# Patient Record
Sex: MALE | Race: WHITE
[De-identification: names, ages, dates, MRNs, and addresses within clinical notes are randomized per-mention and may not be internally consistent; named-entity substitution may affect disease eponyms.]

---

## 2019-06-10 ENCOUNTER — HOSPITAL ENCOUNTER (INPATIENT)
Dept: HOSPITAL 74 - JER | Age: 84
LOS: 7 days | Discharge: HOME | DRG: 641 | End: 2019-06-17
Attending: INTERNAL MEDICINE | Admitting: INTERNAL MEDICINE
Payer: COMMERCIAL

## 2019-06-10 VITALS — BODY MASS INDEX: 18.8 KG/M2

## 2019-06-10 DIAGNOSIS — R06.2: ICD-10-CM

## 2019-06-10 DIAGNOSIS — J98.11: ICD-10-CM

## 2019-06-10 DIAGNOSIS — E03.9: ICD-10-CM

## 2019-06-10 DIAGNOSIS — E78.5: ICD-10-CM

## 2019-06-10 DIAGNOSIS — R13.10: ICD-10-CM

## 2019-06-10 DIAGNOSIS — E87.70: ICD-10-CM

## 2019-06-10 DIAGNOSIS — K76.0: ICD-10-CM

## 2019-06-10 DIAGNOSIS — Z95.1: ICD-10-CM

## 2019-06-10 DIAGNOSIS — Z86.73: ICD-10-CM

## 2019-06-10 DIAGNOSIS — I10: ICD-10-CM

## 2019-06-10 DIAGNOSIS — R11.10: ICD-10-CM

## 2019-06-10 DIAGNOSIS — J90: ICD-10-CM

## 2019-06-10 DIAGNOSIS — Z79.84: ICD-10-CM

## 2019-06-10 DIAGNOSIS — N17.9: ICD-10-CM

## 2019-06-10 DIAGNOSIS — E87.5: ICD-10-CM

## 2019-06-10 DIAGNOSIS — E11.9: ICD-10-CM

## 2019-06-10 DIAGNOSIS — E87.1: Primary | ICD-10-CM

## 2019-06-10 LAB
ALBUMIN SERPL-MCNC: 3.2 G/DL (ref 3.4–5)
ALP SERPL-CCNC: 113 U/L (ref 45–117)
ALT SERPL-CCNC: 22 U/L (ref 13–61)
ANION GAP SERPL CALC-SCNC: 7 MMOL/L (ref 8–16)
ANION GAP SERPL CALC-SCNC: 7 MMOL/L (ref 8–16)
AST SERPL-CCNC: 25 U/L (ref 15–37)
BASOPHILS # BLD: 0.8 % (ref 0–2)
BILIRUB SERPL-MCNC: 0.7 MG/DL (ref 0.2–1)
BUN SERPL-MCNC: 22.4 MG/DL (ref 7–18)
BUN SERPL-MCNC: 25 MG/DL (ref 7–18)
CALCIUM SERPL-MCNC: 8.3 MG/DL (ref 8.5–10.1)
CALCIUM SERPL-MCNC: 9 MG/DL (ref 8.5–10.1)
CHLORIDE SERPL-SCNC: 89 MMOL/L (ref 98–107)
CHLORIDE SERPL-SCNC: 91 MMOL/L (ref 98–107)
CO2 SERPL-SCNC: 26 MMOL/L (ref 21–32)
CO2 SERPL-SCNC: 27 MMOL/L (ref 21–32)
CREAT SERPL-MCNC: 1.6 MG/DL (ref 0.55–1.3)
CREAT SERPL-MCNC: 1.6 MG/DL (ref 0.55–1.3)
DEPRECATED RDW RBC AUTO: 12.8 % (ref 11.9–15.9)
EOSINOPHIL # BLD: 0.1 % (ref 0–4.5)
GLUCOSE SERPL-MCNC: 193 MG/DL (ref 74–106)
GLUCOSE SERPL-MCNC: 240 MG/DL (ref 74–106)
HCT VFR BLD CALC: 38.1 % (ref 35.4–49)
HGB BLD-MCNC: 12.6 GM/DL (ref 11.7–16.9)
LIPASE SERPL-CCNC: 77 U/L (ref 73–393)
LYMPHOCYTES # BLD: 21.3 % (ref 8–40)
MCH RBC QN AUTO: 31.5 PG (ref 25.7–33.7)
MCHC RBC AUTO-ENTMCNC: 33.1 G/DL (ref 32–35.9)
MCV RBC: 95.2 FL (ref 80–96)
MONOCYTES # BLD AUTO: 8.3 % (ref 3.8–10.2)
NEUTROPHILS # BLD: 69.5 % (ref 42.8–82.8)
PLATELET # BLD AUTO: 219 K/MM3 (ref 134–434)
PMV BLD: 8.2 FL (ref 7.5–11.1)
POTASSIUM SERPLBLD-SCNC: 5.4 MMOL/L (ref 3.5–5.1)
POTASSIUM SERPLBLD-SCNC: 5.8 MMOL/L (ref 3.5–5.1)
PROT SERPL-MCNC: 7.2 G/DL (ref 6.4–8.2)
RBC # BLD AUTO: 4.01 M/MM3 (ref 4–5.6)
SODIUM SERPL-SCNC: 123 MMOL/L (ref 136–145)
SODIUM SERPL-SCNC: 124 MMOL/L (ref 136–145)
WBC # BLD AUTO: 4.7 K/MM3 (ref 4–10)

## 2019-06-10 PROCEDURE — G0378 HOSPITAL OBSERVATION PER HR: HCPCS

## 2019-06-10 RX ADMIN — HEPARIN SODIUM SCH UNIT: 5000 INJECTION, SOLUTION INTRAVENOUS; SUBCUTANEOUS at 22:45

## 2019-06-10 RX ADMIN — SODIUM CHLORIDE SCH MLS/HR: 9 INJECTION, SOLUTION INTRAVENOUS at 18:27

## 2019-06-10 NOTE — PDOC
History of Present Illness





- General


Chief Complaint: Nausea/Vomiting


Stated Complaint: VOMITING/ COUGHING/ ABD PAIN


Time Seen by Provider: 06/10/19 13:29


History Source: Patient, Family (Wife present at bedside)


Exam Limitations: No Limitations





- History of Present Illness


Travel History: No


Initial Comments: 





06/10/19 13:52


88 y/o M with PMHx of DM, Arthritis, Stroke, HTN, HLD, Hypothyroidism presents 

with 2 day hx of nausea and NBNB vomiting. Patient present with his wife, who 

is present at bedside and provide part of the history. Patient was in his usual 

state of health, tolerating diet until saturday evening when suddenly he felt 

nauseous while watching television. Since onset, patient has had 10 episodes of 

NBNB vomiting and has been unable to tolerate PO Intake. This is the first time 

he has experienced this many episodes of vomiting. Additionally he complains of 

mild LUQ pain that has accompanied some episodes.





Pcp: Dr. Hampton ([facility])


Cardio: Dr. Samano ([facility])


PMHx: HTN, HLD, DM, Arthritis, Stroke


PSHx: CABG


Allergies: Denies


Social: Denies tobacco, EtOh, Drugs


FHx: Father had DM








Past History





- Travel


Traveled outside of the country in the last 30 days: No


Close contact w/someone who was outside of country & ill: No





- Past Medical History


Allergies/Adverse Reactions: 


 Allergies











Allergy/AdvReac Type Severity Reaction Status Date / Time


 


No Known Allergies Allergy   Verified 06/10/19 11:21











Home Medications: 


Ambulatory Orders





Carvedilol 12.5 mg PO DAILY 06/10/19 


Cilostazol 50 mg PO DAILY 06/10/19 


Glimepiride 2 mg PO DAILY 06/10/19 


Isosorbide Mononitrate [Isosorbide Mononitrate ER] 60 mg PO DAILY 06/10/19 


Levothyroxine [Synthroid -] 50 mcg PO DAILY 06/10/19 








Cardiac Disorders: Yes (cardiac bypass possible stents)


COPD: No


Diabetes: Yes


GI Disorders: Yes


HTN: Yes


Thyroid Disease: Yes


Other medical history: Arthritis, poor circulation





- Surgical History


Cardiac Surgery: Yes





- Suicide/Smoking/Psychosocial Hx


Smoking History: Never smoked


Information on smoking cessation initiated: No


Hx Alcohol Use: No


Drug/Substance Use Hx: No





Abd/GI Specific PMHX





- Complaint Specific PMHX


Colitis: No


Diverticulitis: No


Gall Bladder Disease: No


GERD: No


Hepatitis: No


Irritable Bowel Synd (IBS): No


Pancreatitis: No


GI Ulcer Disease: No





**Review of Systems





- Review of Systems


Able to Perform ROS?: Yes


Is the patient limited English proficient: Yes


Constitutional: No: Chills, Diaphoresis, Fever


HEENTM: No: Blurred Vision, Throat Pain


Respiratory: Yes: Cough, Wheezing.  No: Shortness of Breath


Cardiac (ROS): No: Chest Pain, Edema


ABD/GI: Yes: Nausea, Vomiting.  No: Constipated, Diarrhea


: No: Dysuria, Hematuria


Neurological: No: Numbness, Tingling





*Physical Exam





- Vital Signs


 Last Vital Signs











Temp Pulse Resp BP Pulse Ox


 


 97.5 F L  73   18   104/80   97 


 


 06/10/19 11:17  06/10/19 11:17  06/10/19 11:17  06/10/19 11:17  06/10/19 11:17














- Physical Exam


General Appearance: Yes: Nourished, Appropriately Dressed


HEENT: positive: EOMI, BRENNA.  negative: Pharyngeal Erythema, Tonsillar Exudate


Neck: positive: Supple


Respiratory/Chest: positive: Wheezing, Other (Diminished breath sounds at the 

bases)


Cardiovascular: positive: Regular Rhythm, Regular Rate, S1, S2.  negative: Edema

, JVD, Murmur


Gastrointestinal/Abdominal: positive: Normal Bowel Sounds, Soft.  negative: 

Guarding, Rebound, Tenderness


Musculoskeletal: negative: CVA Tenderness


Extremity: negative: Swelling


Neurologic: positive: CNs II-XII NML intact, Fully Oriented, Alert





ED Treatment Course





- LABORATORY


CBC & Chemistry Diagram: 


 06/10/19 13:58





 06/10/19 13:58





Medical Decision Making





- Medical Decision Making





06/10/19 14:14


88 y/o M with PMHx of DM, Arthritis, Stroke, HTN, HLD, Hypothyroidism presents 

with 2 day hx of nausea and NBNB vomiting.


Check CBC, CMP, Trop, Lipase, CXR


Give 1L NS, Acetaminophen 1g, Zofran, Duoneb


Will reasses








06/10/19 15:51


 Laboratory Last Values











WBC  4.7 K/mm3 (4.0-10.0)   06/10/19  13:58    


 


RBC  4.01 M/mm3 (4.00-5.60)   06/10/19  13:58    


 


Hgb  12.6 GM/dL (11.7-16.9)   06/10/19  13:58    


 


Hct  38.1 % (35.4-49)   06/10/19  13:58    


 


MCV  95.2 fl (80-96)   06/10/19  13:58    


 


MCH  31.5 pg (25.7-33.7)   06/10/19  13:58    


 


MCHC  33.1 g/dl (32.0-35.9)   06/10/19  13:58    


 


RDW  12.8 % (11.9-15.9)   06/10/19  13:58    


 


Plt Count  219 K/MM3 (134-434)   06/10/19  13:58    


 


MPV  8.2 fl (7.5-11.1)   06/10/19  13:58    


 


Absolute Neuts (auto)  3.2 K/mm3 (1.5-8.0)   06/10/19  13:58    


 


Neutrophils %  69.5 % (42.8-82.8)   06/10/19  13:58    


 


Lymphocytes %  21.3 % (8-40)   06/10/19  13:58    


 


Monocytes %  8.3 % (3.8-10.2)   06/10/19  13:58    


 


Eosinophils %  0.1 % (0-4.5)   06/10/19  13:58    


 


Basophils %  0.8 % (0-2.0)   06/10/19  13:58    


 


Nucleated RBC %  0 % (0-0)   06/10/19  13:58    


 


Sodium  123 mmol/L (136-145)  L  06/10/19  13:58    


 


Potassium  5.8 mmol/L (3.5-5.1)  H  06/10/19  13:58    


 


Chloride  89 mmol/L ()  L  06/10/19  13:58    


 


Carbon Dioxide  27 mmol/L (21-32)   06/10/19  13:58    


 


Anion Gap  7 MMOL/L (8-16)  L  06/10/19  13:58    


 


BUN  22.4 mg/dL (7-18)  H  06/10/19  13:58    


 


Creatinine  1.6 mg/dL (0.55-1.3)  H  06/10/19  13:58    


 


Est GFR (CKD-EPI)AfAm  43.62   06/10/19  13:58    


 


Est GFR (CKD-EPI)NonAf  37.64   06/10/19  13:58    


 


Random Glucose  193 mg/dL ()  H  06/10/19  13:58    


 


Calcium  9.0 mg/dL (8.5-10.1)   06/10/19  13:58    


 


Total Bilirubin  0.7 mg/dL (0.2-1)   06/10/19  13:58    


 


AST  25 U/L (15-37)   06/10/19  13:58    


 


ALT  22 U/L (13-61)   06/10/19  13:58    


 


Alkaline Phosphatase  113 U/L ()   06/10/19  13:58    


 


Troponin I  0.03 ng/ml (0.00-0.05)   06/10/19  13:58    


 


Total Protein  7.2 g/dl (6.4-8.2)   06/10/19  13:58    


 


Albumin  3.2 g/dl (3.4-5.0)  L  06/10/19  13:58    


 


Lipase  77 U/L ()   06/10/19  13:58    








Labwork noted above noted Na 123, K+ 5.8, Cl 89.


Dr. ren paged for admission for symptomatic hyponatremia.








06/10/19 16:29


Case discussed with Dr. Ren--will observe on med-surg


Start IV Hydration with NS @ 100 mls/hr; Goal is to raise Na no greater than 8 

in the first 24 hours.





06/10/19 17:13


CXR: A single AP view of the chest reveals a left effusion with left base 

atelectasis or infiltrate. There is a large heart, unfolded aorta, normal johnny, 

clear left upper lobe and clear right lung. There are sternal sutures. 

Correlation recommended. Follow-up needed. 





Given weakness and productive cough, will cover with Azithromycin and 

Ceftriaxone. 








*DC/Admit/Observation/Transfer


Diagnosis at time of Disposition: 


 Hyponatremia, Wheezing








- Discharge Dispostion


Condition at time of disposition: Guarded


Decision to Admit order: Yes





- Referrals





- Patient Instructions





- Post Discharge Activity

## 2019-06-10 NOTE — HP
Admitting History and Physical





- Primary Care Physician


PCP: Ely Villatoro





- Admission


History of Present Illness: 





89 y/o M with PMHx of DM, Arthritis, Stroke, HTN, HLD, Hypothyroidism presents 

with 2 day hx of nausea and NBNB vomiting. Patient present with his wife, who 

is present at bedside and provide part of the history. Patient was in his usual 

state of health, tolerating diet until saturday evening when suddenly he felt 

nauseous while watching television. Since onset, patient has had 10 episodes of 

NBNB vomiting and has been unable to tolerate PO Intake. This is the first time 

he has experienced this many episodes of vomiting. Additionally he complains of 

mild LUQ pain that has accompanied some episodes.











- Past Medical History


Cardiovascular: Yes: HTN, Hyperlipdemia





- Smoking History


Smoking history: Never smoked





- Alcohol/Substance Use


Hx Alcohol Use: No





Home Medications





- Allergies


Allergies/Adverse Reactions: 


 Allergies











Allergy/AdvReac Type Severity Reaction Status Date / Time


 


No Known Allergies Allergy   Verified 06/10/19 11:21














- Home Medications


Home Medications: 


Ambulatory Orders





Carvedilol 12.5 mg PO DAILY 06/10/19 


Cilostazol 50 mg PO DAILY 06/10/19 


Glimepiride 2 mg PO DAILY 06/10/19 


Isosorbide Mononitrate [Isosorbide Mononitrate ER] 60 mg PO DAILY 06/10/19 


Levothyroxine [Synthroid -] 50 mcg PO DAILY 06/10/19 











Physical Examination


Vital Signs: 


 Vital Signs











Temperature  98.5 F   06/10/19 17:52


 


Pulse Rate  72   06/10/19 17:52


 


Respiratory Rate  19   06/10/19 17:52


 


Blood Pressure  133/68   06/10/19 17:52


 


O2 Sat by Pulse Oximetry (%)  99   06/10/19 17:52











Constitutional: Yes: No Distress


HENT: Yes: Atraumatic


Neck: Yes: Supple


Cardiovascular: Yes: Regular Rate and Rhythm


Respiratory: Yes: CTA Bilaterally


Gastrointestinal: Yes: Normal Bowel Sounds


Extremities: Yes: WNL


Edema: No


Peripheral Pulses WNL: Yes


Neurological: Yes: Alert, Oriented


Labs: 


 CBC, BMP





 06/10/19 13:58 





 06/10/19 13:58 











Imaging





- Results


X-ray: Report Reviewed


Cat Scan: Report Reviewed





Problem List





- Problems


(1) Hyponatremia


Assessment/Plan: 


will replace


Code(s): E87.1 - HYPO-OSMOLALITY AND HYPONATREMIA   





(2) Wheezing


Code(s): R06.2 - WHEEZING   





(3) Diabetes mellitus


Assessment/Plan: 


monitor bs


Code(s): E11.9 - TYPE 2 DIABETES MELLITUS WITHOUT COMPLICATIONS   





(4) HTN (hypertension)


Assessment/Plan: 


on meds


Code(s): I10 - ESSENTIAL (PRIMARY) HYPERTENSION   





(5) Hyperkalemia


Assessment/Plan: 


monitor and treat 


renal on board


Code(s): E87.5 - HYPERKALEMIA   





(6) Vomiting


Assessment/Plan: 


prn zofran


better


Code(s): R11.10 - VOMITING, UNSPECIFIED   





Assessment/Plan





 Laboratory Tests











  06/10/19 06/10/19





  13:58 13:58


 


WBC  4.7 


 


RBC  4.01 


 


Hgb  12.6 


 


Hct  38.1 


 


MCV  95.2 


 


MCH  31.5 


 


MCHC  33.1 


 


RDW  12.8 


 


Plt Count  219 


 


MPV  8.2 


 


Absolute Neuts (auto)  3.2 


 


Neutrophils %  69.5 


 


Lymphocytes %  21.3 


 


Monocytes %  8.3 


 


Eosinophils %  0.1 


 


Basophils %  0.8 


 


Nucleated RBC %  0 


 


Sodium   123 L


 


Potassium   5.8 H


 


Chloride   89 L


 


Carbon Dioxide   27


 


Anion Gap   7 L


 


BUN   22.4 H


 


Creatinine   1.6 H


 


Est GFR (CKD-EPI)AfAm   43.62


 


Est GFR (CKD-EPI)NonAf   37.64


 


Random Glucose   193 H


 


Calcium   9.0


 


Total Bilirubin   0.7


 


AST   25


 


ALT   22


 


Alkaline Phosphatase   113


 


Troponin I   0.03


 


Total Protein   7.2


 


Albumin   3.2 L


 


Lipase   77








Active Medications











Generic Name Dose Route Start Last Admin





  Trade Name Freq  PRN Reason Stop Dose Admin


 


Carvedilol  12.5 mg  06/11/19 10:00  





  Coreg -  PO   





  DAILY ERICA   





     





     





     





     


 


Cilostazol  50 mg  06/11/19 10:00  





  Pletal -  PO   





  DAILY ERICA   





     





     





     





     


 


Glimepiride  2 mg  06/11/19 10:00  





  Amaryl -  PO   





  DAILY ERICA   





     





     





     





     


 


Sodium Chloride  1,000 mls @ 100 mls/hr  06/10/19 17:00  06/10/19 17:51





  Normal Saline -  IV   100 mls/hr





  ASDIR ERICA   Administration





     





     





     





     


 


Azithromycin 500 mg/ Dextrose  250 mls @ 250 mls/hr  06/10/19 17:15  06/10/19 17

:55





  IVPB   250 mls/hr





  DAILY ERICA   Administration





     





     





     





     


 


Ceftriaxone Sodium 1 gm/  100 mls @ 200 mls/hr  06/10/19 17:15  06/10/19 17:51





  Dextrose  IVPB   200 mls/hr





  DAILY ERICA   Administration





     





     





  Protocol   





     


 


Sodium Chloride  1,000 mls @ 100 mls/hr  06/10/19 18:15  





  Normal Saline -  IV   





  ASDIR ERICA   





     





     





     





     


 


Isosorbide Mononitrate  60 mg  06/11/19 10:00  





  Imdur -  PO   





  DAILY ERICA   





     





     





     





     


 


Levothyroxine Sodium  50 mcg  06/11/19 10:00  





  Synthroid -  PO   





  DAILY ERICA

## 2019-06-10 NOTE — CONSULT
Consult


Consult Specialty:: Nephrology


Reason for Consultation:: hyponatremia and hyperkalemia





- History of Present Illness


Chief Complaint: nausea and vomiting


History of Present Illness: 





Pt is an 89 year old male with pmhx of DM, arhtritis, CVA, HLD, hypothyroisims, 

and HTN who presents to the ER with nausea and vomiting. He says that he has 

had these symptoms for the last 5 days. He says that the nausea and vomiting 

have gotten worse over the last two days. HE denies abdominal pain. He denies 

nsaid use. He was found to be hyponatremic and hyperkalemic. He has loss of 

appetite. He denies dysuria or hematuria. No one else at home has similar 

symptoms. He does not recall eating anything out of the ordinary. 





- History Source


History Provided By: Patient, Medical Record





- Past Medical History


Cardio/Vascular: Yes: HTN, Hyperlipdemia


Endocrine: Yes: Diabetes Mellitus, Hypothyroidism





- Alcohol/Substance Use


Hx Alcohol Use: No





- Smoking History


Smoking history: Never smoked





Home Medications





- Allergies


Allergies/Adverse Reactions: 


 Allergies











Allergy/AdvReac Type Severity Reaction Status Date / Time


 


No Known Allergies Allergy   Verified 06/10/19 11:21














- Home Medications


Home Medications: 


Ambulatory Orders





Carvedilol 12.5 mg PO DAILY 06/10/19 


Cilostazol 50 mg PO DAILY 06/10/19 


Glimepiride 2 mg PO DAILY 06/10/19 


Isosorbide Mononitrate [Isosorbide Mononitrate ER] 60 mg PO DAILY 06/10/19 


Levothyroxine [Synthroid -] 50 mcg PO DAILY 06/10/19 











Family Disease History





- Family Disease History


Family History: Denies





Review of Systems





- Review of Systems


Constitutional: reports: Malaise.  denies: Chills, Fever


Eyes: reports: No Symptoms


HENT: reports: No Symptoms


Neck: reports: No Symptoms


Cardiovascular: reports: No Symptoms


Respiratory: reports: No Symptoms


Gastrointestinal: reports: Nausea, Vomiting.  denies: Vomiting Blood


Genitourinary: reports: No Symptoms


Musculoskeletal: reports: No Symptoms


Integumentary: reports: No Symptoms


Neurological: reports: No Symptoms


Endocrine: reports: No Symptoms


Hematology/Lymphatic: reports: No Symptoms


Psychiatric: reports: No Symptoms





Physical Exam


Vital Signs: 


 Vital Signs











Temperature  98.5 F   06/10/19 17:52


 


Pulse Rate  72   06/10/19 17:52


 


Respiratory Rate  19   06/10/19 17:52


 


Blood Pressure  133/68   06/10/19 17:52


 


O2 Sat by Pulse Oximetry (%)  99   06/10/19 17:52











Constitutional: Yes: Calm


Eyes: Yes: Conjunctiva Clear


HENT: Yes: Atraumatic


Neck: Yes: Supple


Cardiovascular: Yes: S1, S2


Respiratory: Yes: CTA Bilaterally


Gastrointestinal: Yes: Normal Bowel Sounds, Soft


Renal/: Yes: WNL


Musculoskeletal: Yes: WNL


Edema: No


Neurological: Yes: Oriented


Psychiatric: Yes: Oriented


Labs: 


 CBC, BMP





 06/10/19 13:58 





 06/10/19 13:58 





 Laboratory Tests











  06/10/19 06/10/19





  13:58 13:58


 


WBC  4.7 


 


Hgb  12.6 


 


Plt Count  219 


 


Sodium   123 L


 


Potassium   5.8 H


 


Chloride   89 L


 


BUN   22.4 H


 


Creatinine   1.6 H


 


Random Glucose   193 H


 


Albumin   3.2 L














Imaging





- Results


Chest X-ray: Report Reviewed (left pleural effusion)





Problem List





- Problems


(1) Hyperkalemia


Code(s): E87.5 - HYPERKALEMIA   





(2) HTN (hypertension)


Code(s): I10 - ESSENTIAL (PRIMARY) HYPERTENSION   





(3) Diabetes mellitus


Code(s): E11.9 - TYPE 2 DIABETES MELLITUS WITHOUT COMPLICATIONS   





(4) Vomiting


Code(s): R11.10 - VOMITING, UNSPECIFIED   





(5) Hyponatremia


Code(s): E87.1 - HYPO-OSMOLALITY AND HYPONATREMIA   





Assessment/Plan





 Current Medications











Generic Name Dose Route Start Last Admin





  Trade Name Freq  PRN Reason Stop Dose Admin


 


Carvedilol  12.5 mg  06/11/19 10:00  





  Coreg -  PO   





  DAILY ERICA   





     





     





     





     


 


Cilostazol  50 mg  06/11/19 10:00  





  Pletal -  PO   





  DAILY ERICA   





     





     





     





     


 


Glimepiride  2 mg  06/11/19 07:00  





  Amaryl -  PO   





  ACBK ERICA   





     





     





     





     


 


Heparin Sodium (Porcine)  5,000 unit  06/10/19 22:00  





  Heparin -  SQ   





  BID ERICA   





     





     





     





     


 


Azithromycin 500 mg/ Dextrose  250 mls @ 250 mls/hr  06/10/19 17:15  06/10/19 17

:55





  IVPB   250 mls/hr





  DAILY ERICA   Administration





     





     





     





     


 


Ceftriaxone Sodium 1 gm/  100 mls @ 200 mls/hr  06/10/19 17:15  06/10/19 17:51





  Dextrose  IVPB   200 mls/hr





  DAILY ERICA   Administration





     





     





  Protocol   





     


 


Sodium Chloride  1,000 mls @ 100 mls/hr  06/10/19 18:15  06/10/19 18:27





  Normal Saline -  IV   100 mls/hr





  ASDIR ERICA   Administration





     





     





     





     


 


Isosorbide Mononitrate  60 mg  06/11/19 10:00  





  Imdur -  PO   





  DAILY ERICA   





     





     





     





     


 


Levothyroxine Sodium  50 mcg  06/11/19 10:00  





  Synthroid -  PO   





  DAILY@0700 ERICA   





     





     





     





     








Impression


1. hyponatremia


2. hyperkalemia


3. nausea and vomiting


4. htn


5. dm


6. hypothyroidism


7. shalom vs ckd





Plan


- check ua


- check urine lytes and crt


- repeat bmp, spoke to nurse to call me with results


- can cont fluids for now


- check renal ultrasound


- electrolyte abnormalities may be from vomiting

## 2019-06-11 LAB
ALBUMIN SERPL-MCNC: 3 G/DL (ref 3.4–5)
ALP SERPL-CCNC: 107 U/L (ref 45–117)
ALT SERPL-CCNC: 19 U/L (ref 13–61)
ANION GAP SERPL CALC-SCNC: 8 MMOL/L (ref 8–16)
APPEARANCE UR: CLEAR
AST SERPL-CCNC: 18 U/L (ref 15–37)
BACTERIA #/AREA URNS HPF: 0.3 /HPF
BASOPHILS # BLD: 0.9 % (ref 0–2)
BILIRUB SERPL-MCNC: 0.5 MG/DL (ref 0.2–1)
BILIRUB UR STRIP.AUTO-MCNC: NEGATIVE MG/DL
BUN SERPL-MCNC: 22.1 MG/DL (ref 7–18)
CALCIUM SERPL-MCNC: 8.6 MG/DL (ref 8.5–10.1)
CASTS #/AREA URNS LPF: 3 /LPF (ref 0–8)
CHLORIDE SERPL-SCNC: 94 MMOL/L (ref 98–107)
CO2 SERPL-SCNC: 24 MMOL/L (ref 21–32)
COLOR UR: YELLOW
CREAT SERPL-MCNC: 1.6 MG/DL (ref 0.55–1.3)
DEPRECATED RDW RBC AUTO: 13.1 % (ref 11.9–15.9)
EOSINOPHIL # BLD: 0.7 % (ref 0–4.5)
EPITH CASTS URNS QL MICRO: 0.2 /HPF
GLUCOSE SERPL-MCNC: 103 MG/DL (ref 74–106)
HCT VFR BLD CALC: 35.6 % (ref 35.4–49)
HGB BLD-MCNC: 12 GM/DL (ref 11.7–16.9)
KETONES UR QL STRIP: NEGATIVE
LEUKOCYTE ESTERASE UR QL STRIP.AUTO: NEGATIVE
LYMPHOCYTES # BLD: 30.5 % (ref 8–40)
MCH RBC QN AUTO: 32 PG (ref 25.7–33.7)
MCHC RBC AUTO-ENTMCNC: 33.7 G/DL (ref 32–35.9)
MCV RBC: 95.1 FL (ref 80–96)
MONOCYTES # BLD AUTO: 13.5 % (ref 3.8–10.2)
NEUTROPHILS # BLD: 54.4 % (ref 42.8–82.8)
NITRITE UR QL STRIP: NEGATIVE
PH UR: 5.5 [PH] (ref 5–8)
PLATELET # BLD AUTO: 194 K/MM3 (ref 134–434)
PMV BLD: 8.6 FL (ref 7.5–11.1)
POTASSIUM SERPLBLD-SCNC: 5.5 MMOL/L (ref 3.5–5.1)
PROT SERPL-MCNC: 6.5 G/DL (ref 6.4–8.2)
PROT UR QL STRIP: (no result)
PROT UR QL STRIP: NEGATIVE
RBC # BLD AUTO: 1 /HPF (ref 0–4)
RBC # BLD AUTO: 3.74 M/MM3 (ref 4–5.6)
SODIUM SERPL-SCNC: 127 MMOL/L (ref 136–145)
SP GR UR: 1.02 (ref 1.01–1.03)
UROBILINOGEN UR STRIP-MCNC: 0.2 MG/DL (ref 0.2–1)
WBC # BLD AUTO: 5 K/MM3 (ref 4–10)
WBC # UR AUTO: 0 /HPF (ref 0–5)

## 2019-06-11 RX ADMIN — SODIUM CHLORIDE SCH MLS/HR: 9 INJECTION, SOLUTION INTRAVENOUS at 03:03

## 2019-06-11 RX ADMIN — LEVOTHYROXINE SODIUM SCH MCG: 50 TABLET ORAL at 09:01

## 2019-06-11 RX ADMIN — AZITHROMYCIN DIHYDRATE SCH MLS/HR: 500 INJECTION, POWDER, LYOPHILIZED, FOR SOLUTION INTRAVENOUS at 10:24

## 2019-06-11 RX ADMIN — SODIUM CHLORIDE SCH: 9 INJECTION, SOLUTION INTRAVENOUS at 21:25

## 2019-06-11 RX ADMIN — CARVEDILOL SCH MG: 12.5 TABLET, FILM COATED ORAL at 09:01

## 2019-06-11 RX ADMIN — HEPARIN SODIUM SCH UNIT: 5000 INJECTION, SOLUTION INTRAVENOUS; SUBCUTANEOUS at 21:25

## 2019-06-11 RX ADMIN — CILOSTAZOL SCH MG: 50 TABLET ORAL at 10:23

## 2019-06-11 RX ADMIN — GLIMEPIRIDE SCH MG: 2 TABLET ORAL at 07:08

## 2019-06-11 RX ADMIN — ISOSORBIDE MONONITRATE SCH MG: 60 TABLET, EXTENDED RELEASE ORAL at 09:01

## 2019-06-11 RX ADMIN — CEFTRIAXONE SCH MLS/HR: 1 INJECTION, POWDER, FOR SOLUTION INTRAMUSCULAR; INTRAVENOUS at 09:52

## 2019-06-11 RX ADMIN — PANTOPRAZOLE SODIUM SCH MG: 40 TABLET, DELAYED RELEASE ORAL at 15:36

## 2019-06-11 RX ADMIN — HEPARIN SODIUM SCH UNIT: 5000 INJECTION, SOLUTION INTRAVENOUS; SUBCUTANEOUS at 09:01

## 2019-06-11 RX ADMIN — SODIUM CHLORIDE SCH MLS/HR: 9 INJECTION, SOLUTION INTRAVENOUS at 16:37

## 2019-06-11 NOTE — CONS
DATE OF CONSULTATION:  

 

DATE OF DICTATION:  06/11/2019 

 

GASTROENTEROLOGY CONSULTATION 

 

The patient is an 89-year-old male with a past medical history of diabetes,

arthritis, CVA, hypertension, hyperlipidemia, hypothyroidism, who presents with 
a

2-day history of nausea and vomiting.  As per the patient's history, he states 
that

he is still spitting up phlegm, and he is experiencing some epigastric burning

sensation.  His symptoms apparently began on Saturday, after he developed nausea

while watching television.  He denies previous episodes in the past.  At this 
time.

He denies any abdominal pain, diarrhea, constipation, blood in the stool or

hematemesis.  He has never had an endoscopic evaluation, as per his history and

review of the chart.  

 

PAST MEDICAL AND SURGICAL HISTORY:  As listed in the HPI.

 

ALLERGIES:  No known drug allergies. 

 

SOCIAL HISTORY:  Does not drink or smoke.  Lives with family.

 

HOME MEDICATIONS:  Include carvedilol, cilostazol, glimepiride, isosorbide and

Synthroid. 

 

FAMILY HISTORY:  No history of GI or gynecological malignancy. 

 

PHYSICAL EXAMINATION:  

Vital Signs:  Temperature 98, pulse 80, respiratory rate 12, blood pressure 150/
93,

pulse oximetry 98% on room air. 

General:  In no acute distress.

HEENT:  Anicteric sclerae. 

Cardiovascular:  S1/S2.  Regular rate and rhythm.  

Lungs:  Bilaterally clear to auscultation.  

Abdomen:  Soft and nontender. 

Extremities:  No edema.

 

LABORATORY DATA:  White blood cell count 5, hemoglobin 12, hematocrit 35, MCV 95
,

platelet count 194.  Sodium 127, potassium 5.5, BUN 22, creatinine 1.6, glucose 
103,

AST 18, ALT 19, alkaline phosphatase 107, total bilirubin 0.5.  Urine protein 1+
. 

 

IMAGING:  He had an abdomen and pelvis CT scan without contrast, which revealed

pleural effusion, bibasilar consolidation versus atelectasis, mild cardiomegaly
, and

calcification of the coronary arteries.  Limited evaluation of the abdomen and 
pelvis

considering there is no contrast.  There is no evidence of a small bowel 
obstruction.

 However, dense calcified plaques in the abdominal aorta wall down to its

bifurcation, without aneurysmal dilation.  Subcutaneous edema.  A suggestion of

anasarca.  

 

IMPRESSION:  Nausea and vomiting with epigastric burning sensation, further

complicated by hyponatremia and wheezing.  His current symptoms may be 
secondary to

gastroesophageal reflux disease, peptic ulcer disease or an infectious 
etiology. 

 

RECOMMENDATION:  He should be started on Protonix 40 mg IV daily.  Clear liquid 
diet

today and can be advanced as tolerated to a full liquid diet and to a bland 
diet. 

Further evaluation of his hyponatremia as per the primary medical team.  
Continue

antibiotics and nebulizers for his wheeze and pulmonary process.  He is a very 
poor

historian.  There is some expression of  dysphagia.  Will therefore order an

esophagram and upper GI series to further evaluate.  Would prefer to hold off 
on any

invasive procedures at this time considering his cardiopulmonary process and 
advanced

age.  The patient will be followed by the GI service. 

 

 

DO CHRISTIANO SANTA/[id number]

DD: 06/11/2019 13:35

DT: 06/11/2019 19:09

Job #:  [id number]

MTDD

## 2019-06-11 NOTE — CON.ID
Consult


Consult Specialty:: infectious diseases


Referred by:: 


Reason for Consultation:: cough sputum production





- History of Present Illness


Chief Complaint: cough with sputum production


History of Present Illness: 





88 y/o M with PMHx of DM, Arthritis, Stroke, HTN, HLD, Hypothyroidism presents 

with 2 day hx of nausea and NBNB vomiting. Patient was in his usual state of 

health, tolerating diet until saturday evening when suddenly he felt nauseous 

while watching television. Since onset, patient has had 10 episodes of NBNB 

vomiting and has been unable to tolerate PO Intake. This is the first time he 

has experienced this many episodes of vomiting. Additionally he complains of 

mild LUQ pain that has accompanied some episodes.


patient now mentions that he has no abd issues but is producing cough and 

thinks his lungs are involved











- History Source


History Provided By: Patient, Caregiver


Limitations to Obtaining History: Language Barrier





- Past Medical History


Cardio/Vascular: Yes: HTN, Hyperlipdemia


Endocrine: Yes: Diabetes Mellitus, Hypothyroidism





- Alcohol/Substance Use


Hx Alcohol Use: No





- Smoking History


Smoking history: Never smoked





Home Medications





- Allergies


Allergies/Adverse Reactions: 


 Allergies











Allergy/AdvReac Type Severity Reaction Status Date / Time


 


No Known Allergies Allergy   Verified 06/10/19 11:21














- Home Medications


Home Medications: 


Ambulatory Orders





Carvedilol 12.5 mg PO DAILY 06/10/19 


Cilostazol 50 mg PO DAILY 06/10/19 


Glimepiride 2 mg PO DAILY 06/10/19 


Isosorbide Mononitrate [Isosorbide Mononitrate ER] 60 mg PO DAILY 06/10/19 


Levothyroxine [Synthroid -] 50 mcg PO DAILY 06/10/19 











Review of Systems





- Review of Systems


Constitutional: reports: No Symptoms


Eyes: reports: No Symptoms


HENT: reports: No Symptoms


Neck: reports: No Symptoms


Cardiovascular: reports: No Symptoms


Respiratory: reports: Cough, Other (sputum production)


Gastrointestinal: reports: Nausea, Vomiting


Genitourinary: reports: No Symptoms


Breasts: reports: No Symptoms Reported


Musculoskeletal: reports: No Symptoms


Integumentary: reports: No Symptoms


Neurological: reports: No Symptoms


Endocrine: reports: No Symptoms


Hematology/Lymphatic: reports: No Symptoms


Psychiatric: reports: No Symptoms





Physical Exam


Vital Signs: 


 Vital Signs











Temperature  98.8 F   06/11/19 09:57


 


Pulse Rate  80   06/11/19 09:57


 


Respiratory Rate  20   06/11/19 09:57


 


Blood Pressure  153/93   06/11/19 09:57


 


O2 Sat by Pulse Oximetry (%)  98   06/11/19 08:56











Constitutional: Yes: No Distress, Calm, Thin


Neck: Yes: Supple, Trachea Midline


Cardiovascular: Yes: Regular Rate and Rhythm


Respiratory: Yes: Regular, CTA Bilaterally


Gastrointestinal: Yes: Normal Bowel Sounds, Soft


Musculoskeletal: Yes: WNL


Extremities: Yes: WNL


Neurological: Yes: Alert, Oriented


Psychiatric: Yes: Alert, Oriented


Labs: 


 CBC, BMP





 06/11/19 07:20 





 06/11/19 07:20 











Imaging





- Results


Chest X-ray: Report Reviewed, Image Reviewed


Cat Scan: Report Reviewed, Image Reviewed





Assessment/Plan





 Problem List 





- Problems


(1) Hyponatremia


Code(s): E87.1 - HYPO-OSMOLALITY AND HYPONATREMIA   





(2) Wheezing


Code(s): R06.2 - WHEEZING   





3 vomiting


cough





plan





continue current mgmt


await for all reports


rest as per the team

## 2019-06-11 NOTE — PN
Progress Note, Physician


History of Present Illness: 





Pt seen and examined at bedside. He is awake and alert. He denies shortness of 

breath. 





- Current Medication List


Current Medications: 


Active Medications





Carvedilol (Coreg -)  12.5 mg PO DAILY [facility]


   Last Admin: 06/11/19 09:01 Dose:  12.5 mg


Cilostazol (Pletal -)  50 mg PO DAILY [facility]


   Last Admin: 06/11/19 10:23 Dose:  50 mg


Glimepiride (Amaryl -)  2 mg PO ACBK [facility]


   Last Admin: 06/11/19 07:08 Dose:  2 mg


Heparin Sodium (Porcine) (Heparin -)  5,000 unit SQ BID [facility]


   Last Admin: 06/11/19 09:01 Dose:  5,000 unit


Sodium Chloride (Normal Saline -)  1,000 mls @ 100 mls/hr IV ASDIR [facility]


   Last Admin: 06/11/19 03:03 Dose:  100 mls/hr


Ceftriaxone Sodium 1 gm/ (Dextrose)  50 mls @ 100 mls/hr IVPB DAILY [facility]; 

Protocol


   Last Admin: 06/11/19 09:52 Dose:  100 mls/hr


Azithromycin (Zithromax 500mg Ivpb (Pre-Docked))  500 mg in 250 mls @ 250 mls/

hr IVPB DAILY [facility]


   Last Admin: 06/11/19 10:24 Dose:  250 mls/hr


Isosorbide Mononitrate (Imdur -)  60 mg PO DAILY [facility]


   Last Admin: 06/11/19 09:01 Dose:  60 mg


Levothyroxine Sodium (Synthroid -)  50 mcg PO DAILY@0700 [facility]


   Last Admin: 06/11/19 09:01 Dose:  50 mcg


Pantoprazole Sodium (Protonix -)  40 mg PO DAILY [facility]











- Objective


Vital Signs: 


 Vital Signs











Temperature  98.8 F   06/11/19 09:57


 


Pulse Rate  80   06/11/19 09:57


 


Respiratory Rate  20   06/11/19 09:57


 


Blood Pressure  153/93   06/11/19 09:57


 


O2 Sat by Pulse Oximetry (%)  98   06/11/19 08:56











Constitutional: Yes: Calm


Eyes: Yes: Conjunctiva Clear


HENT: Yes: Atraumatic


Neck: Yes: Supple


Cardiovascular: Yes: S1, S2


Respiratory: Yes: CTA Bilaterally


Gastrointestinal: Yes: Soft


Genitourinary: Yes: WNL


Extremities: Yes: WNL


Edema: No


Neurological: Yes: Oriented


Psychiatric: Yes: Oriented


Labs: 


 CBC, BMP





 06/11/19 07:20 





 06/11/19 07:20 











Problem List





- Problems


(1) Hyperkalemia


Code(s): E87.5 - HYPERKALEMIA   





(2) HTN (hypertension)


Code(s): I10 - ESSENTIAL (PRIMARY) HYPERTENSION   





(3) Diabetes mellitus


Code(s): E11.9 - TYPE 2 DIABETES MELLITUS WITHOUT COMPLICATIONS   





(4) Vomiting


Code(s): R11.10 - VOMITING, UNSPECIFIED   





(5) Hyponatremia


Code(s): E87.1 - HYPO-OSMOLALITY AND HYPONATREMIA   





Assessment/Plan


 Current Medications











Generic Name Dose Route Start Last Admin





  Trade Name Freq  PRN Reason Stop Dose Admin


 


Carvedilol  12.5 mg  06/11/19 10:00  06/11/19 09:01





  Coreg -  PO   12.5 mg





  DAILY ERICA   Administration





     





     





     





     


 


Cilostazol  50 mg  06/11/19 10:00  06/11/19 10:23





  Pletal -  PO   50 mg





  DAILY ERICA   Administration





     





     





     





     


 


Glimepiride  2 mg  06/11/19 07:00  06/11/19 07:08





  Amaryl -  PO   2 mg





  ACBK ERICA   Administration





     





     





     





     


 


Heparin Sodium (Porcine)  5,000 unit  06/10/19 22:00  06/11/19 09:01





  Heparin -  SQ   5,000 unit





  BID ERICA   Administration





     





     





     





     


 


Sodium Chloride  1,000 mls @ 100 mls/hr  06/10/19 18:15  06/11/19 03:03





  Normal Saline -  IV   100 mls/hr





  ASDIR ERICA   Administration





     





     





     





     


 


Ceftriaxone Sodium 1 gm/  50 mls @ 100 mls/hr  06/11/19 08:27  06/11/19 09:52





  Dextrose  IVPB   100 mls/hr





  DAILY ERICA   Administration





     





     





  Protocol   





     


 


Azithromycin  500 mg in 250 mls @ 250 mls/hr  06/11/19 09:20  06/11/19 10:24





  Zithromax 500mg Ivpb (Pre-Docked)  IVPB   250 mls/hr





  DAILY ERICA   Administration





     





     





     





     


 


Isosorbide Mononitrate  60 mg  06/11/19 10:00  06/11/19 09:01





  Imdur -  PO   60 mg





  DAILY ERICA   Administration





     





     





     





     


 


Levothyroxine Sodium  50 mcg  06/11/19 10:00  06/11/19 09:01





  Synthroid -  PO   50 mcg





  DAILY@0700 ERICA   Administration





     





     





     





     


 


Pantoprazole Sodium  40 mg  06/11/19 13:45  





  Protonix -  PO   





  DAILY ERICA   





     





     





     





     











Impression


1. hyponatremia


2. hyperkalemia


3. nausea and vomiting


4. htn


5. dm


6. hypothyroidism


7. shalom vs ckd





Plan


- cont with fluids


- sodium improving


- will give a dose of lokelma


- check renal ultrasound, was just done


- electrolyte abnormalities may be from vomiting

## 2019-06-11 NOTE — PN
Progress Note, Physician


History of Present Illness: 





stable





- Current Medication List


Current Medications: 


Active Medications





Carvedilol (Coreg -)  12.5 mg PO DAILY [facility]


   Last Admin: 06/11/19 09:01 Dose:  12.5 mg


Cilostazol (Pletal -)  50 mg PO DAILY [facility]


   Last Admin: 06/11/19 10:23 Dose:  50 mg


Glimepiride (Amaryl -)  2 mg PO ACBK [facility]


   Last Admin: 06/11/19 07:08 Dose:  2 mg


Heparin Sodium (Porcine) (Heparin -)  5,000 unit SQ BID [facility]


   Last Admin: 06/11/19 09:01 Dose:  5,000 unit


Sodium Chloride (Normal Saline -)  1,000 mls @ 100 mls/hr IV ASDIR [facility]


   Last Admin: 06/11/19 03:03 Dose:  100 mls/hr


Ceftriaxone Sodium 1 gm/ (Dextrose)  50 mls @ 100 mls/hr IVPB DAILY [facility]; 

Protocol


   Last Admin: 06/11/19 09:52 Dose:  100 mls/hr


Azithromycin (Zithromax 500mg Ivpb (Pre-Docked))  500 mg in 250 mls @ 250 mls/

hr IVPB DAILY [facility]


   Last Admin: 06/11/19 10:24 Dose:  250 mls/hr


Isosorbide Mononitrate (Imdur -)  60 mg PO DAILY [facility]


   Last Admin: 06/11/19 09:01 Dose:  60 mg


Levothyroxine Sodium (Synthroid -)  50 mcg PO DAILY@0700 [facility]


   Last Admin: 06/11/19 09:01 Dose:  50 mcg


Pantoprazole Sodium (Protonix -)  40 mg PO DAILY [facility]


   Last Admin: 06/11/19 15:36 Dose:  40 mg











- Objective


Vital Signs: 


 Vital Signs











Temperature  98.8 F   06/11/19 09:57


 


Pulse Rate  80   06/11/19 09:57


 


Respiratory Rate  20   06/11/19 09:57


 


Blood Pressure  153/93   06/11/19 09:57


 


O2 Sat by Pulse Oximetry (%)  98   06/11/19 08:56











Constitutional: Yes: No Distress


HENT: Yes: Atraumatic


Neck: Yes: Supple


Cardiovascular: Yes: Regular Rate and Rhythm


Respiratory: Yes: CTA Bilaterally


Gastrointestinal: Yes: Normal Bowel Sounds


Extremities: Yes: WNL


Edema: No


Neurological: Yes: Alert, Oriented


Labs: 


 CBC, BMP





 06/11/19 07:20 





 06/11/19 07:20 











Problem List





- Problems


(1) Hyponatremia


Assessment/Plan: 


will replace


Code(s): E87.1 - HYPO-OSMOLALITY AND HYPONATREMIA   





(2) Wheezing


Code(s): R06.2 - WHEEZING   





(3) Hypothyroid


Code(s): E03.9 - HYPOTHYROIDISM, UNSPECIFIED   





(4) Diabetes mellitus


Assessment/Plan: 


monitor bs


Code(s): E11.9 - TYPE 2 DIABETES MELLITUS WITHOUT COMPLICATIONS   





(5) HTN (hypertension)


Assessment/Plan: 


on meds


Code(s): I10 - ESSENTIAL (PRIMARY) HYPERTENSION   





(6) Hyperkalemia


Assessment/Plan: 


monitor and treat 


renal on board


Code(s): E87.5 - HYPERKALEMIA   





(7) Vomiting


Assessment/Plan: 


prn zofran


resolved


Code(s): R11.10 - VOMITING, UNSPECIFIED

## 2019-06-11 NOTE — EKG
Test Reason : 

Blood Pressure : ***/*** mmHG

Vent. Rate : 064 BPM     Atrial Rate : 064 BPM

   P-R Int : 240 ms          QRS Dur : 146 ms

    QT Int : 534 ms       P-R-T Axes : 000 -04 210 degrees

   QTc Int : 550 ms

 

SINUS RHYTHM WITH 1ST DEGREE A-V BLOCK

LEFT BUNDLE BRANCH BLOCK

ABNORMAL ECG

WHEN COMPARED WITH ECG OF 25-JUN-2009 02:08,

RI INTERVAL HAS INCREASED

 

Confirmed by Alessio Nathan MD ([id number]) on 6/11/2019 12:44:38 PM

 

Referred By:             Confirmed By:Alessio Nathan MD

## 2019-06-11 NOTE — CON.GI
Consult





- History of Present Illness


History of Present Illness: 





GI CONSULT DICTATED 





- PPI 


-CLEAR LIQUID DIET ADVANCE AS TOLERATED 


- ESOPHAGRAM / GI SERIES ORDERED





SEE FULL CONSULT DICTATED  





- Past Medical History


Cardio/Vascular: Yes: HTN, Hyperlipdemia


Endocrine: Yes: Diabetes Mellitus, Hypothyroidism





- Alcohol/Substance Use


Hx Alcohol Use: No





- Smoking History


Smoking history: Never smoked





Home Medications





- Allergies


Allergies/Adverse Reactions: 


 Allergies











Allergy/AdvReac Type Severity Reaction Status Date / Time


 


No Known Allergies Allergy   Verified 06/10/19 11:21














- Home Medications


Home Medications: 


Ambulatory Orders





Carvedilol 12.5 mg PO DAILY 06/10/19 


Cilostazol 50 mg PO DAILY 06/10/19 


Glimepiride 2 mg PO DAILY 06/10/19 


Isosorbide Mononitrate [Isosorbide Mononitrate ER] 60 mg PO DAILY 06/10/19 


Levothyroxine [Synthroid -] 50 mcg PO DAILY 06/10/19 











Physical Exam-GI


Vital Signs: 


 Vital Signs











Temperature  97.8 F   06/11/19 06:52


 


Pulse Rate  68   06/11/19 06:52


 


Respiratory Rate  20   06/11/19 06:52


 


Blood Pressure  146/74   06/11/19 06:52


 


O2 Sat by Pulse Oximetry (%)  98   06/11/19 00:20

## 2019-06-12 LAB
ALBUMIN SERPL-MCNC: 2.9 G/DL (ref 3.4–5)
ALP SERPL-CCNC: 93 U/L (ref 45–117)
ALT SERPL-CCNC: 18 U/L (ref 13–61)
ANION GAP SERPL CALC-SCNC: 8 MMOL/L (ref 8–16)
AST SERPL-CCNC: 20 U/L (ref 15–37)
BILIRUB SERPL-MCNC: 0.3 MG/DL (ref 0.2–1)
BUN SERPL-MCNC: 20.3 MG/DL (ref 7–18)
CALCIUM SERPL-MCNC: 7.8 MG/DL (ref 8.5–10.1)
CHLORIDE SERPL-SCNC: 96 MMOL/L (ref 98–107)
CO2 SERPL-SCNC: 25 MMOL/L (ref 21–32)
CREAT SERPL-MCNC: 1.5 MG/DL (ref 0.55–1.3)
GLUCOSE SERPL-MCNC: 99 MG/DL (ref 74–106)
POTASSIUM SERPLBLD-SCNC: 3.9 MMOL/L (ref 3.5–5.1)
PROT SERPL-MCNC: 5.9 G/DL (ref 6.4–8.2)
SODIUM SERPL-SCNC: 129 MMOL/L (ref 136–145)

## 2019-06-12 RX ADMIN — CARVEDILOL SCH MG: 12.5 TABLET, FILM COATED ORAL at 10:19

## 2019-06-12 RX ADMIN — LEVOTHYROXINE SODIUM SCH MCG: 50 TABLET ORAL at 10:19

## 2019-06-12 RX ADMIN — HEPARIN SODIUM SCH UNIT: 5000 INJECTION, SOLUTION INTRAVENOUS; SUBCUTANEOUS at 10:19

## 2019-06-12 RX ADMIN — AZITHROMYCIN DIHYDRATE SCH MLS/HR: 500 INJECTION, POWDER, LYOPHILIZED, FOR SOLUTION INTRAVENOUS at 10:20

## 2019-06-12 RX ADMIN — PANTOPRAZOLE SODIUM SCH MG: 40 TABLET, DELAYED RELEASE ORAL at 10:19

## 2019-06-12 RX ADMIN — GLIMEPIRIDE SCH MG: 2 TABLET ORAL at 10:19

## 2019-06-12 RX ADMIN — CILOSTAZOL SCH MG: 50 TABLET ORAL at 10:22

## 2019-06-12 RX ADMIN — HEPARIN SODIUM SCH UNIT: 5000 INJECTION, SOLUTION INTRAVENOUS; SUBCUTANEOUS at 21:59

## 2019-06-12 RX ADMIN — ISOSORBIDE MONONITRATE SCH MG: 60 TABLET, EXTENDED RELEASE ORAL at 10:19

## 2019-06-12 RX ADMIN — CEFTRIAXONE SCH MLS/HR: 1 INJECTION, POWDER, FOR SOLUTION INTRAMUSCULAR; INTRAVENOUS at 12:32

## 2019-06-12 NOTE — PN.GI
GI Progress Note


Subjective: 





no new complaints - eating with family at the bedside. 





- Objective


Vital Signs: 


 Vital Signs











Temperature  97.7 F   06/12/19 16:20


 


Pulse Rate  77   06/12/19 16:20


 


Respiratory Rate  18   06/12/19 16:20


 


Blood Pressure  117/69   06/12/19 16:20


 


O2 Sat by Pulse Oximetry (%)  97   06/12/19 09:00











Constitutional: Well Nourished, No Distress, Calm


Eyes: Yes: WNL


HENT: Yes: WNL


Neck: Yes: WNL


Cardiovascular: Yes: WNL


Respiratory: Yes: WNL


Gastrointestinal Inspection: Yes: WNL


...Auscultate: Yes: Normoactive Bowel Sounds


Extremities: Yes: WNL


Edema: No


Labs: 


 CBC, BMP





 06/11/19 07:20 





 06/12/19 06:21 











Problem List





- Problems


(1) Dysphagia


Assessment/Plan: 


upper gi series and esophagram reviewed and wnl 


c/w ppi 


advance diet as tolerated 


Code(s): R13.10 - DYSPHAGIA, UNSPECIFIED   





(2) Nausea & vomiting


Code(s): R11.2 - NAUSEA WITH VOMITING, UNSPECIFIED

## 2019-06-12 NOTE — PN
Progress Note, Physician


History of Present Illness: 





Pt seen and examined at bedside. He is awake and alert. He denies dysuria or 

hematuria. 





- Current Medication List


Current Medications: 


Active Medications





Carvedilol (Coreg -)  12.5 mg PO DAILY [facility]


   Last Admin: 06/12/19 10:19 Dose:  12.5 mg


Cilostazol (Pletal -)  50 mg PO DAILY [facility]


   Last Admin: 06/12/19 10:22 Dose:  50 mg


Glimepiride (Amaryl -)  2 mg PO ACBK [facility]


   Last Admin: 06/12/19 10:19 Dose:  2 mg


Heparin Sodium (Porcine) (Heparin -)  5,000 unit SQ BID [facility]


   Last Admin: 06/12/19 10:19 Dose:  5,000 unit


Sodium Chloride (Normal Saline -)  1,000 mls @ 100 mls/hr IV ASDIR [facility]


   Last Admin: 06/11/19 21:25 Dose:  Not Given


Ceftriaxone Sodium 1 gm/ (Dextrose)  50 mls @ 100 mls/hr IVPB DAILY [facility]; 

Protocol


   Last Admin: 06/12/19 12:32 Dose:  100 mls/hr


Azithromycin (Zithromax 500mg Ivpb (Pre-Docked))  500 mg in 250 mls @ 250 mls/

hr IVPB DAILY [facility]


   Last Admin: 06/12/19 10:20 Dose:  250 mls/hr


Isosorbide Mononitrate (Imdur -)  60 mg PO DAILY [facility]


   Last Admin: 06/12/19 10:19 Dose:  60 mg


Levothyroxine Sodium (Synthroid -)  50 mcg PO DAILY@0700 [facility]


   Last Admin: 06/12/19 10:19 Dose:  50 mcg


Pantoprazole Sodium (Protonix -)  40 mg PO DAILY [facility]


   Last Admin: 06/12/19 10:19 Dose:  40 mg











- Objective


Vital Signs: 


 Vital Signs











Temperature  98.8 F   06/12/19 10:00


 


Pulse Rate  80   06/12/19 10:00


 


Respiratory Rate  20   06/12/19 10:00


 


Blood Pressure  148/84   06/12/19 10:00


 


O2 Sat by Pulse Oximetry (%)  98   06/11/19 21:00











Constitutional: Yes: Calm


Eyes: Yes: Conjunctiva Clear


HENT: Yes: Atraumatic


Neck: Yes: Supple


Cardiovascular: Yes: S1, S2


Respiratory: Yes: CTA Bilaterally


Gastrointestinal: Yes: Soft


Genitourinary: Yes: WNL


Musculoskeletal: Yes: WNL


Edema: No


Neurological: Yes: Oriented


Psychiatric: Yes: Oriented


Labs: 


 CBC, BMP





 06/11/19 07:20 





 06/12/19 06:21 











Problem List





- Problems


(1) Hyperkalemia


Code(s): E87.5 - HYPERKALEMIA   





(2) HTN (hypertension)


Code(s): I10 - ESSENTIAL (PRIMARY) HYPERTENSION   





(3) Diabetes mellitus


Code(s): E11.9 - TYPE 2 DIABETES MELLITUS WITHOUT COMPLICATIONS   





(4) Vomiting


Code(s): R11.10 - VOMITING, UNSPECIFIED   





(5) Hyponatremia


Code(s): E87.1 - HYPO-OSMOLALITY AND HYPONATREMIA   





Assessment/Plan


 Current Medications











Generic Name Dose Route Start Last Admin





  Trade Name Freq  PRN Reason Stop Dose Admin


 


Carvedilol  12.5 mg  06/11/19 10:00  06/12/19 10:19





  Coreg -  PO   12.5 mg





  DAILY ERICA   Administration





     





     





     





     


 


Cilostazol  50 mg  06/11/19 10:00  06/12/19 10:22





  Pletal -  PO   50 mg





  DAILY ERICA   Administration





     





     





     





     


 


Glimepiride  2 mg  06/11/19 07:00  06/12/19 10:19





  Amaryl -  PO   2 mg





  ACBK ERICA   Administration





     





     





     





     


 


Heparin Sodium (Porcine)  5,000 unit  06/10/19 22:00  06/12/19 10:19





  Heparin -  SQ   5,000 unit





  BID ERICA   Administration





     





     





     





     


 


Sodium Chloride  1,000 mls @ 100 mls/hr  06/10/19 18:15  06/11/19 21:25





  Normal Saline -  IV   Not Given





  ASDIR ERICA   





     





     





     





     


 


Ceftriaxone Sodium 1 gm/  50 mls @ 100 mls/hr  06/11/19 08:27  06/12/19 12:32





  Dextrose  IVPB   100 mls/hr





  DAILY ERICA   Administration





     





     





  Protocol   





     


 


Azithromycin  500 mg in 250 mls @ 250 mls/hr  06/11/19 09:20  06/12/19 10:20





  Zithromax 500mg Ivpb (Pre-Docked)  IVPB   250 mls/hr





  DAILY ERICA   Administration





     





     





     





     


 


Isosorbide Mononitrate  60 mg  06/11/19 10:00  06/12/19 10:19





  Imdur -  PO   60 mg





  DAILY ERICA   Administration





     





     





     





     


 


Levothyroxine Sodium  50 mcg  06/11/19 10:00  06/12/19 10:19





  Synthroid -  PO   50 mcg





  DAILY@0700 ERICA   Administration





     





     





     





     


 


Pantoprazole Sodium  40 mg  06/11/19 13:45  06/12/19 10:19





  Protonix -  PO   40 mg





  DAILY ERICA   Administration





     





     





     





     














Impression


1. hyponatremia


2. hyperkalemia


3. nausea and vomiting


4. htn


5. dm


6. hypothyroidism


7. shalom vs ckd


8. fatty liver





Plan


- renal ultrasound reviewed


- potassium improved


- sodium improving


- cont fluids, decrease rate

## 2019-06-12 NOTE — PN
Progress Note, Physician


History of Present Illness: 





stable





- Current Medication List


Current Medications: 


Active Medications





Carvedilol (Coreg -)  12.5 mg PO DAILY [facility]


   Last Admin: 06/12/19 10:19 Dose:  12.5 mg


Cilostazol (Pletal -)  50 mg PO DAILY [facility]


   Last Admin: 06/12/19 10:22 Dose:  50 mg


Glimepiride (Amaryl -)  2 mg PO ACBK [facility]


   Last Admin: 06/12/19 10:19 Dose:  2 mg


Heparin Sodium (Porcine) (Heparin -)  5,000 unit SQ BID [facility]


   Last Admin: 06/12/19 10:19 Dose:  5,000 unit


Ceftriaxone Sodium 1 gm/ (Dextrose)  50 mls @ 100 mls/hr IVPB DAILY [facility]; 

Protocol


   Last Admin: 06/12/19 12:32 Dose:  100 mls/hr


Azithromycin (Zithromax 500mg Ivpb (Pre-Docked))  500 mg in 250 mls @ 250 mls/

hr IVPB DAILY [facility]


   Last Admin: 06/12/19 10:20 Dose:  250 mls/hr


Sodium Chloride (Normal Saline -)  1,000 mls @ 75 mls/hr IV ASDIR [facility]


Isosorbide Mononitrate (Imdur -)  60 mg PO DAILY [facility]


   Last Admin: 06/12/19 10:19 Dose:  60 mg


Levothyroxine Sodium (Synthroid -)  50 mcg PO DAILY@0700 [facility]


   Last Admin: 06/12/19 10:19 Dose:  50 mcg


Pantoprazole Sodium (Protonix -)  40 mg PO DAILY [facility]


   Last Admin: 06/12/19 10:19 Dose:  40 mg











- Objective


Vital Signs: 


 Vital Signs











Temperature  97.7 F   06/12/19 16:20


 


Pulse Rate  77   06/12/19 16:20


 


Respiratory Rate  18   06/12/19 16:20


 


Blood Pressure  117/69   06/12/19 16:20


 


O2 Sat by Pulse Oximetry (%)  97   06/12/19 09:00











Constitutional: Yes: No Distress


HENT: Yes: Atraumatic


Neck: Yes: Supple


Cardiovascular: Yes: Regular Rate and Rhythm


Respiratory: Yes: CTA Bilaterally


Gastrointestinal: Yes: Normal Bowel Sounds


Extremities: Yes: WNL


Edema: No


Neurological: Yes: Alert, Oriented


Labs: 


 CBC, BMP





 06/11/19 07:20 





 06/12/19 06:21 











Problem List





- Problems


(1) Hyponatremia


Assessment/Plan: 


improving


Code(s): E87.1 - HYPO-OSMOLALITY AND HYPONATREMIA   





(2) Wheezing


Code(s): R06.2 - WHEEZING   





(3) Hypothyroid


Assessment/Plan: 


on meds


Code(s): E03.9 - HYPOTHYROIDISM, UNSPECIFIED   





(4) Diabetes mellitus


Assessment/Plan: 


monitor bs


Code(s): E11.9 - TYPE 2 DIABETES MELLITUS WITHOUT COMPLICATIONS   





(5) HTN (hypertension)


Assessment/Plan: 


on meds


Code(s): I10 - ESSENTIAL (PRIMARY) HYPERTENSION   





(6) Hyperkalemia


Assessment/Plan: 


monitor and treat 


renal on board


Code(s): E87.5 - HYPERKALEMIA   





(7) Vomiting


Assessment/Plan: 


prn zofran


resolved


Code(s): R11.10 - VOMITING, UNSPECIFIED   





Assessment/Plan





spoke to daughter in detail who was at bedside

## 2019-06-13 LAB
ALBUMIN SERPL-MCNC: 3 G/DL (ref 3.4–5)
ALP SERPL-CCNC: 101 U/L (ref 45–117)
ALT SERPL-CCNC: 18 U/L (ref 13–61)
ANION GAP SERPL CALC-SCNC: 8 MMOL/L (ref 8–16)
AST SERPL-CCNC: 19 U/L (ref 15–37)
BILIRUB SERPL-MCNC: 0.4 MG/DL (ref 0.2–1)
BUN SERPL-MCNC: 14.7 MG/DL (ref 7–18)
CALCIUM SERPL-MCNC: 8.3 MG/DL (ref 8.5–10.1)
CHLORIDE SERPL-SCNC: 100 MMOL/L (ref 98–107)
CO2 SERPL-SCNC: 26 MMOL/L (ref 21–32)
CREAT SERPL-MCNC: 1.5 MG/DL (ref 0.55–1.3)
GLUCOSE SERPL-MCNC: 137 MG/DL (ref 74–106)
POTASSIUM SERPLBLD-SCNC: 3.9 MMOL/L (ref 3.5–5.1)
PROT SERPL-MCNC: 6.3 G/DL (ref 6.4–8.2)
SODIUM SERPL-SCNC: 134 MMOL/L (ref 136–145)

## 2019-06-13 RX ADMIN — CARVEDILOL SCH MG: 12.5 TABLET, FILM COATED ORAL at 10:04

## 2019-06-13 RX ADMIN — PANTOPRAZOLE SODIUM SCH MG: 40 TABLET, DELAYED RELEASE ORAL at 10:04

## 2019-06-13 RX ADMIN — CEFTRIAXONE SCH MLS/HR: 1 INJECTION, POWDER, FOR SOLUTION INTRAMUSCULAR; INTRAVENOUS at 10:03

## 2019-06-13 RX ADMIN — CILOSTAZOL SCH MG: 50 TABLET ORAL at 10:04

## 2019-06-13 RX ADMIN — AZITHROMYCIN DIHYDRATE SCH MLS/HR: 500 INJECTION, POWDER, LYOPHILIZED, FOR SOLUTION INTRAVENOUS at 11:21

## 2019-06-13 RX ADMIN — ISOSORBIDE MONONITRATE SCH MG: 60 TABLET, EXTENDED RELEASE ORAL at 10:04

## 2019-06-13 RX ADMIN — GLIMEPIRIDE SCH MG: 2 TABLET ORAL at 08:40

## 2019-06-13 RX ADMIN — SODIUM CHLORIDE SCH: 9 INJECTION, SOLUTION INTRAVENOUS at 21:58

## 2019-06-13 RX ADMIN — LEVOTHYROXINE SODIUM SCH MCG: 50 TABLET ORAL at 07:02

## 2019-06-13 RX ADMIN — HEPARIN SODIUM SCH UNIT: 5000 INJECTION, SOLUTION INTRAVENOUS; SUBCUTANEOUS at 10:04

## 2019-06-13 RX ADMIN — SODIUM CHLORIDE SCH MLS/HR: 9 INJECTION, SOLUTION INTRAVENOUS at 10:08

## 2019-06-13 RX ADMIN — HEPARIN SODIUM SCH UNIT: 5000 INJECTION, SOLUTION INTRAVENOUS; SUBCUTANEOUS at 21:58

## 2019-06-13 NOTE — PN
Progress Note, Physician


History of Present Illness: 





Pt seen and examined, events noted. He is alert, without SOB. Wife at bedside 

states he usually has dry cough at night. Remains afebrile. 





- Current Medication List


Current Medications: 


Active Medications





Carvedilol (Coreg -)  12.5 mg PO DAILY [facility]


   Last Admin: 06/13/19 10:04 Dose:  12.5 mg


Cilostazol (Pletal -)  50 mg PO DAILY [facility]


   Last Admin: 06/13/19 10:04 Dose:  50 mg


Glimepiride (Amaryl -)  2 mg PO ACBK [facility]


   Last Admin: 06/13/19 08:40 Dose:  2 mg


Heparin Sodium (Porcine) (Heparin -)  5,000 unit SQ BID [facility]


   Last Admin: 06/13/19 10:04 Dose:  5,000 unit


Sodium Chloride (Normal Saline -)  1,000 mls @ 75 mls/hr IV ASDIR [facility]


   Last Admin: 06/13/19 10:08 Dose:  75 mls/hr


Isosorbide Mononitrate (Imdur -)  60 mg PO DAILY [facility]


   Last Admin: 06/13/19 10:04 Dose:  60 mg


Levothyroxine Sodium (Synthroid -)  50 mcg PO DAILY@0700 [facility]


   Last Admin: 06/13/19 07:02 Dose:  50 mcg


Pantoprazole Sodium (Protonix -)  40 mg PO DAILY [facility]


   Last Admin: 06/13/19 10:04 Dose:  40 mg











- Objective


Vital Signs: 


 Vital Signs











Temperature  97.6 F   06/13/19 16:15


 


Pulse Rate  92 H  06/13/19 16:15


 


Respiratory Rate  18   06/13/19 16:15


 


Blood Pressure  147/75   06/13/19 16:15


 


O2 Sat by Pulse Oximetry (%)  98   06/13/19 09:00











Constitutional: Yes: No Distress, Calm


Cardiovascular: Yes: Regular Rate and Rhythm


Respiratory: Yes: CTA Bilaterally


Gastrointestinal: Yes: Normal Bowel Sounds, Soft


Integumentary: Yes: WNL


Neurological: Yes: Alert


Labs: 


 CBC, BMP





 06/11/19 07:20 





 06/13/19 08:15 











Problem List





- Problems


(1) Diabetes mellitus


Code(s): E11.9 - TYPE 2 DIABETES MELLITUS WITHOUT COMPLICATIONS   





(2) Dysphagia


Code(s): R13.10 - DYSPHAGIA, UNSPECIFIED   





(3) HTN (hypertension)


Code(s): I10 - ESSENTIAL (PRIMARY) HYPERTENSION   





(4) Nausea & vomiting


Code(s): R11.2 - NAUSEA WITH VOMITING, UNSPECIFIED   





Assessment/Plan





Cough


N/V


DM


HTN


HLD


Hypothyroidism





-- d/c Ceftriaxone/Azithromycin, switch to Augmentin 500 mg po bid x 3 days 

starting tomorrow


-- pt for discharge 


currently stable

## 2019-06-13 NOTE — DS
Physical Examination


Vital Signs: 


 Vital Signs











Temperature  98.4 F   06/13/19 10:00


 


Pulse Rate  93 H  06/13/19 10:00


 


Respiratory Rate  20   06/13/19 10:00


 


Blood Pressure  123/82   06/13/19 10:00


 


O2 Sat by Pulse Oximetry (%)  98   06/13/19 09:00











Labs: 


 CBC, BMP





 06/11/19 07:20 





 06/13/19 08:15 











Discharge Summary


Reason For Visit: HYPONATREMIA


Current Active Problems





Diabetes mellitus (Acute)


Dysphagia (Acute)


HTN (hypertension) (Acute)


Hyperkalemia (Acute)


Hyponatremia (Acute)


Hypothyroid (Acute)


Nausea & vomiting (Acute)


Vomiting (Acute)


Wheezing (Acute)








Condition: Guarded





- Instructions





- Home Medications


Comprehensive Discharge Medication List: 


Ambulatory Orders





Carvedilol 12.5 mg PO DAILY 06/10/19 


Cilostazol 50 mg PO DAILY 06/10/19 


Glimepiride 2 mg PO DAILY 06/10/19 


Isosorbide Mononitrate [Isosorbide Mononitrate ER] 60 mg PO DAILY 06/10/19 


Levothyroxine [Synthroid -] 50 mcg PO DAILY 06/10/19 


Amoxicillin/Potassium Clav [Augmentin 875-125 Tablet] 1 each PO BID #10 tablet 

06/13/19

## 2019-06-13 NOTE — PN
Progress Note, Physician





- Current Medication List


Current Medications: 


Active Medications





Carvedilol (Coreg -)  12.5 mg PO DAILY [facility]


   Last Admin: 06/13/19 10:04 Dose:  12.5 mg


Cilostazol (Pletal -)  50 mg PO DAILY [facility]


   Last Admin: 06/13/19 10:04 Dose:  50 mg


Glimepiride (Amaryl -)  2 mg PO ACBK [facility]


   Last Admin: 06/13/19 08:40 Dose:  2 mg


Heparin Sodium (Porcine) (Heparin -)  5,000 unit SQ BID [facility]


   Last Admin: 06/13/19 10:04 Dose:  5,000 unit


Ceftriaxone Sodium 1 gm/ (Dextrose)  50 mls @ 100 mls/hr IVPB DAILY [facility]; 

Protocol


   Last Admin: 06/13/19 10:03 Dose:  100 mls/hr


Azithromycin (Zithromax 500mg Ivpb (Pre-Docked))  500 mg in 250 mls @ 250 mls/

hr IVPB DAILY [facility]


   Last Admin: 06/13/19 11:21 Dose:  250 mls/hr


Sodium Chloride (Normal Saline -)  1,000 mls @ 75 mls/hr IV ASDIR [facility]


   Last Admin: 06/13/19 10:08 Dose:  75 mls/hr


Isosorbide Mononitrate (Imdur -)  60 mg PO DAILY [facility]


   Last Admin: 06/13/19 10:04 Dose:  60 mg


Levothyroxine Sodium (Synthroid -)  50 mcg PO DAILY@0700 [facility]


   Last Admin: 06/13/19 07:02 Dose:  50 mcg


Pantoprazole Sodium (Protonix -)  40 mg PO DAILY [facility]


   Last Admin: 06/13/19 10:04 Dose:  40 mg











- Objective


Vital Signs: 


 Vital Signs











Temperature  98.4 F   06/13/19 10:00


 


Pulse Rate  93 H  06/13/19 10:00


 


Respiratory Rate  20   06/13/19 10:00


 


Blood Pressure  123/82   06/13/19 10:00


 


O2 Sat by Pulse Oximetry (%)  98   06/13/19 09:00











Constitutional: Yes: No Distress


HENT: Yes: Atraumatic


Neck: Yes: Supple


Cardiovascular: Yes: Regular Rate and Rhythm


Respiratory: Yes: CTA Bilaterally


Gastrointestinal: Yes: Normal Bowel Sounds


Extremities: Yes: WNL


Edema: No


Peripheral Pulses WNL: Yes


Neurological: Yes: Alert, Oriented


Labs: 


 CBC, BMP





 06/11/19 07:20 





 06/13/19 08:15 











Problem List





- Problems


(1) Hyponatremia


Assessment/Plan: 


improving


Code(s): E87.1 - HYPO-OSMOLALITY AND HYPONATREMIA   





(2) Wheezing


Code(s): R06.2 - WHEEZING   





(3) Hypothyroid


Assessment/Plan: 


on meds


Code(s): E03.9 - HYPOTHYROIDISM, UNSPECIFIED   





(4) Diabetes mellitus


Assessment/Plan: 


monitor bs


Code(s): E11.9 - TYPE 2 DIABETES MELLITUS WITHOUT COMPLICATIONS   





(5) HTN (hypertension)


Assessment/Plan: 


on meds


Code(s): I10 - ESSENTIAL (PRIMARY) HYPERTENSION   





(6) Hyperkalemia


Assessment/Plan: 


monitor and treat 


renal on board


Code(s): E87.5 - HYPERKALEMIA   





(7) Vomiting


Assessment/Plan: 


prn zofran


resolved


Code(s): R11.10 - VOMITING, UNSPECIFIED

## 2019-06-13 NOTE — PN
Progress Note, Physician


History of Present Illness: 





Pt seen and examined at bedside. He is awake and appears comfortable. He denies 

shortness of breath. 





- Current Medication List


Current Medications: 


Active Medications





Carvedilol (Coreg -)  12.5 mg PO DAILY [facility]


   Last Admin: 06/13/19 10:04 Dose:  12.5 mg


Cilostazol (Pletal -)  50 mg PO DAILY [facility]


   Last Admin: 06/13/19 10:04 Dose:  50 mg


Glimepiride (Amaryl -)  2 mg PO ACBK [facility]


   Last Admin: 06/13/19 08:40 Dose:  2 mg


Heparin Sodium (Porcine) (Heparin -)  5,000 unit SQ BID [facility]


   Last Admin: 06/13/19 10:04 Dose:  5,000 unit


Ceftriaxone Sodium 1 gm/ (Dextrose)  50 mls @ 100 mls/hr IVPB DAILY [facility]; 

Protocol


   Last Admin: 06/13/19 10:03 Dose:  100 mls/hr


Azithromycin (Zithromax 500mg Ivpb (Pre-Docked))  500 mg in 250 mls @ 250 mls/

hr IVPB DAILY [facility]


   Last Admin: 06/13/19 11:21 Dose:  250 mls/hr


Sodium Chloride (Normal Saline -)  1,000 mls @ 75 mls/hr IV ASDIR [facility]


   Last Admin: 06/13/19 10:08 Dose:  75 mls/hr


Isosorbide Mononitrate (Imdur -)  60 mg PO DAILY [facility]


   Last Admin: 06/13/19 10:04 Dose:  60 mg


Levothyroxine Sodium (Synthroid -)  50 mcg PO DAILY@0700 [facility]


   Last Admin: 06/13/19 07:02 Dose:  50 mcg


Pantoprazole Sodium (Protonix -)  40 mg PO DAILY [facility]


   Last Admin: 06/13/19 10:04 Dose:  40 mg











- Objective


Vital Signs: 


 Vital Signs











Temperature  98.4 F   06/13/19 10:00


 


Pulse Rate  93 H  06/13/19 10:00


 


Respiratory Rate  20   06/13/19 10:00


 


Blood Pressure  123/82   06/13/19 10:00


 


O2 Sat by Pulse Oximetry (%)  98   06/13/19 09:00











Constitutional: Yes: Calm


Eyes: Yes: Conjunctiva Clear


HENT: Yes: Atraumatic


Neck: Yes: Supple


Cardiovascular: Yes: S1, S2


Respiratory: Yes: CTA Bilaterally


Gastrointestinal: Yes: Soft


Genitourinary: Yes: WNL


Musculoskeletal: Yes: WNL


Edema: No


Neurological: Yes: Oriented


Psychiatric: Yes: Oriented


Labs: 


 CBC, BMP





 06/11/19 07:20 





 06/13/19 08:15 











Problem List





- Problems


(1) Hyperkalemia


Code(s): E87.5 - HYPERKALEMIA   





(2) HTN (hypertension)


Code(s): I10 - ESSENTIAL (PRIMARY) HYPERTENSION   





(3) Diabetes mellitus


Code(s): E11.9 - TYPE 2 DIABETES MELLITUS WITHOUT COMPLICATIONS   





(4) Vomiting


Code(s): R11.10 - VOMITING, UNSPECIFIED   





(5) Hyponatremia


Code(s): E87.1 - HYPO-OSMOLALITY AND HYPONATREMIA   





Assessment/Plan


 Current Medications











Generic Name Dose Route Start Last Admin





  Trade Name Freq  PRN Reason Stop Dose Admin


 


Carvedilol  12.5 mg  06/11/19 10:00  06/13/19 10:04





  Coreg -  PO   12.5 mg





  DAILY ERICA   Administration





     





     





     





     


 


Cilostazol  50 mg  06/11/19 10:00  06/13/19 10:04





  Pletal -  PO   50 mg





  DAILY [facility]   Administration





     





     





     





     


 


Glimepiride  2 mg  06/11/19 07:00  06/13/19 08:40





  Amaryl -  PO   2 mg





  ACBK [facility]   Administration





     





     





     





     


 


Heparin Sodium (Porcine)  5,000 unit  06/10/19 22:00  06/13/19 10:04





  Heparin -  SQ   5,000 unit





  BID ERICA   Administration





     





     





     





     


 


Ceftriaxone Sodium 1 gm/  50 mls @ 100 mls/hr  06/11/19 08:27  06/13/19 10:03





  Dextrose  IVPB   100 mls/hr





  DAILY [facility]   Administration





     





     





  Protocol   





     


 


Azithromycin  500 mg in 250 mls @ 250 mls/hr  06/11/19 09:20  06/13/19 11:21





  Zithromax 500mg Ivpb (Pre-Docked)  IVPB   250 mls/hr





  DAILY [facility]   Administration





     





     





     





     


 


Sodium Chloride  1,000 mls @ 75 mls/hr  06/12/19 13:16  06/13/19 10:08





  Normal Saline -  IV   75 mls/hr





  ASDIR [facility]   Administration





     





     





     





     


 


Isosorbide Mononitrate  60 mg  06/11/19 10:00  06/13/19 10:04





  Imdur -  PO   60 mg





  DAILY [facility]   Administration





     





     





     





     


 


Levothyroxine Sodium  50 mcg  06/11/19 10:00  06/13/19 07:02





  Synthroid -  PO   50 mcg





  DAILY@0700 [facility]   Administration





     





     





     





     


 


Pantoprazole Sodium  40 mg  06/11/19 13:45  06/13/19 10:04





  Protonix -  PO   40 mg





  DAILY [facility]   Administration





     





     





     





     














Impression


1. hyponatremia


2. hyperkalemia


3. nausea and vomiting


4. htn


5. dm


6. hypothyroidism


7. shalom vs ckd


8. fatty liver





Plan


- sodium improving


- potassium stable


- cont fluids


- crt remains elevated, unclear what baseline is. will continue


to trend. Can see pt in office

## 2019-06-14 LAB
ALBUMIN SERPL-MCNC: 3.2 G/DL (ref 3.4–5)
ALP SERPL-CCNC: 178 U/L (ref 45–117)
ALT SERPL-CCNC: 53 U/L (ref 13–61)
ANION GAP SERPL CALC-SCNC: 13 MMOL/L (ref 8–16)
ANISOCYTOSIS BLD QL: (no result)
ARTERIAL BLOOD GAS PCO2: 39.4 MMHG (ref 35–45)
ARTERIAL PATENCY WRIST A: POSITIVE
AST SERPL-CCNC: 85 U/L (ref 15–37)
BASE EXCESS BLDA CALC-SCNC: -8 MEQ/L (ref -2–2)
BASOPHILS # BLD: 1.2 % (ref 0–2)
BILIRUB SERPL-MCNC: 0.7 MG/DL (ref 0.2–1)
BUN SERPL-MCNC: 15.9 MG/DL (ref 7–18)
CALCIUM SERPL-MCNC: 8.4 MG/DL (ref 8.5–10.1)
CHLORIDE SERPL-SCNC: 102 MMOL/L (ref 98–107)
CO2 SERPL-SCNC: 20 MMOL/L (ref 21–32)
CREAT SERPL-MCNC: 1.9 MG/DL (ref 0.55–1.3)
DEPRECATED RDW RBC AUTO: 13 % (ref 11.9–15.9)
EOSINOPHIL # BLD: 0.4 % (ref 0–4.5)
GLUCOSE SERPL-MCNC: 223 MG/DL (ref 74–106)
HCT VFR BLD CALC: 36.7 % (ref 35.4–49)
HGB BLD-MCNC: 12 GM/DL (ref 11.7–16.9)
LYMPHOCYTES # BLD: 19 % (ref 8–40)
MACROCYTES BLD QL: (no result)
MCH RBC QN AUTO: 31.7 PG (ref 25.7–33.7)
MCHC RBC AUTO-ENTMCNC: 32.8 G/DL (ref 32–35.9)
MCV RBC: 96.6 FL (ref 80–96)
MONOCYTES # BLD AUTO: 12.5 % (ref 3.8–10.2)
NEUTROPHILS # BLD: 66.9 % (ref 42.8–82.8)
OVALOCYTES BLD QL SMEAR: (no result)
PLATELET # BLD AUTO: 242 K/MM3 (ref 134–434)
PLATELET BLD QL SMEAR: NORMAL
PMV BLD: 8.2 FL (ref 7.5–11.1)
PO2 BLDA: 123 MMHG (ref 80–105)
POTASSIUM SERPLBLD-SCNC: 3.7 MMOL/L (ref 3.5–5.1)
PROT SERPL-MCNC: 6.8 G/DL (ref 6.4–8.2)
RBC # BLD AUTO: 3.8 M/MM3 (ref 4–5.6)
SAO2 % BLDA: 97.7 % (ref 95–98)
SODIUM SERPL-SCNC: 135 MMOL/L (ref 136–145)
WBC # BLD AUTO: 4.6 K/MM3 (ref 4–10)

## 2019-06-14 RX ADMIN — CARVEDILOL SCH: 25 TABLET, FILM COATED ORAL at 22:13

## 2019-06-14 RX ADMIN — HEPARIN SODIUM SCH UNIT: 5000 INJECTION, SOLUTION INTRAVENOUS; SUBCUTANEOUS at 09:07

## 2019-06-14 RX ADMIN — CILOSTAZOL SCH MG: 50 TABLET ORAL at 09:09

## 2019-06-14 RX ADMIN — CARVEDILOL SCH MG: 12.5 TABLET, FILM COATED ORAL at 09:07

## 2019-06-14 RX ADMIN — PANTOPRAZOLE SODIUM SCH MG: 40 TABLET, DELAYED RELEASE ORAL at 09:07

## 2019-06-14 RX ADMIN — AMOXICILLIN AND CLAVULANATE POTASSIUM SCH: 500; 125 TABLET, FILM COATED ORAL at 18:55

## 2019-06-14 RX ADMIN — HEPARIN SODIUM SCH UNIT: 5000 INJECTION, SOLUTION INTRAVENOUS; SUBCUTANEOUS at 22:17

## 2019-06-14 RX ADMIN — LEVOTHYROXINE SODIUM SCH MCG: 50 TABLET ORAL at 06:04

## 2019-06-14 RX ADMIN — GLIMEPIRIDE SCH MG: 2 TABLET ORAL at 06:04

## 2019-06-14 RX ADMIN — SODIUM CHLORIDE SCH MLS/HR: 9 INJECTION, SOLUTION INTRAVENOUS at 01:59

## 2019-06-14 RX ADMIN — ISOSORBIDE MONONITRATE SCH MG: 60 TABLET, EXTENDED RELEASE ORAL at 09:07

## 2019-06-14 NOTE — PN
Progress Note, Physician


History of Present Illness: 





Pt seen and examined at bedside. He was hypertensive and short of breath this 

morning. Rapid response was called and he was transferred to tele. 





- Current Medication List


Current Medications: 


Active Medications





Amoxicillin/Clavulanate Potassium (Augmentin - 500mg Tablet)  1 tab PO BID@0800,

1730 [facility]


Carvedilol (Coreg -)  12.5 mg PO DAILY [facility]


Cilostazol (Pletal -)  50 mg PO DAILY [facility]


Glimepiride (Amaryl -)  2 mg PO ACBK [facility]


Heparin Sodium (Porcine) (Heparin -)  5,000 unit SQ BID [facility]


Isosorbide Mononitrate (Imdur -)  60 mg PO DAILY [facility]


Levothyroxine Sodium (Synthroid -)  50 mcg PO DAILY@0700 [facility]


Pantoprazole Sodium (Protonix -)  40 mg PO DAILY [facility]











- Objective


Vital Signs: 


 Vital Signs











Temperature  98.3 F   06/14/19 09:00


 


Pulse Rate  111 H  06/14/19 10:00


 


Respiratory Rate  20   06/14/19 10:00


 


Blood Pressure  167/103 H  06/14/19 10:00


 


O2 Sat by Pulse Oximetry (%)  98   06/13/19 21:00











Constitutional: Yes: Calm


Eyes: Yes: Conjunctiva Clear


HENT: Yes: Atraumatic


Neck: Yes: Supple


Cardiovascular: Yes: S1, S2


Respiratory: Yes: On Venti-Mask


Gastrointestinal: Yes: Soft


Genitourinary: Yes: WNL


Musculoskeletal: Yes: WNL


Edema: LLE: Trace, RLE: Trace


Neurological: Yes: Oriented


Psychiatric: Yes: Oriented


Labs: 


 CBC, BMP





 06/14/19 11:24 





 06/14/19 11:24 











Problem List





- Problems


(1) Hyperkalemia


Code(s): E87.5 - HYPERKALEMIA   





(2) HTN (hypertension)


Code(s): I10 - ESSENTIAL (PRIMARY) HYPERTENSION   





(3) Diabetes mellitus


Code(s): E11.9 - TYPE 2 DIABETES MELLITUS WITHOUT COMPLICATIONS   





(4) Vomiting


Code(s): R11.10 - VOMITING, UNSPECIFIED   





(5) Hyponatremia


Code(s): E87.1 - HYPO-OSMOLALITY AND HYPONATREMIA   





Assessment/Plan


 Current Medications











Generic Name Dose Route Start Last Admin





  Trade Name Freq  PRN Reason Stop Dose Admin


 


Amoxicillin/Clavulanate Potassium  1 tab  06/14/19 17:30  





  Augmentin - 500mg Tablet  PO   





  BID@0800,1730 [facility]   





     





     





     





     


 


Carvedilol  12.5 mg  06/15/19 10:00  





  Coreg -  PO   





  DAILY [facility]   





     





     





     





     


 


Cilostazol  50 mg  06/15/19 10:00  





  Pletal -  PO   





  DAILY [facility]   





     





     





     





     


 


Glimepiride  2 mg  06/15/19 07:00  





  Amaryl -  PO   





  ACBK [facility]   





     





     





     





     


 


Heparin Sodium (Porcine)  5,000 unit  06/14/19 22:00  





  Heparin -  SQ   





  BID [facility]   





     





     





     





     


 


Isosorbide Mononitrate  60 mg  06/15/19 10:00  





  Imdur -  PO   





  DAILY [facility]   





     





     





     





     


 


Levothyroxine Sodium  50 mcg  06/15/19 07:00  





  Synthroid -  PO   





  DAILY@0700 [facility]   





     





     





     





     


 


Pantoprazole Sodium  40 mg  06/15/19 10:00  





  Protonix -  PO   





  DAILY [facility]   





     





     





     





     














Impression


1. hyponatremia


2. hyperkalemia


3. nausea and vomiting


4. htn


5. dm


6. hypothyroidism


7. shalom vs ckd


8. fatty liver





Plan


- d/c fluids


- will give another dose of lasix


- increase coreg to 25 bid


- monitor bp closely


- monitor lylawson

## 2019-06-14 NOTE — PN
Progress Note, Physician


History of Present Illness: 





events noted








- Current Medication List


Current Medications: 


Active Medications





Amoxicillin/Clavulanate Potassium (Augmentin - 500mg Tablet)  1 tab PO BID@0800,

1730 [facility]


   Last Admin: 06/14/19 09:00 Dose:  1 tab


Carvedilol (Coreg -)  12.5 mg PO DAILY [facility]


   Last Admin: 06/14/19 09:07 Dose:  12.5 mg


Cilostazol (Pletal -)  50 mg PO DAILY [facility]


   Last Admin: 06/14/19 09:09 Dose:  50 mg


Glimepiride (Amaryl -)  2 mg PO ACBK [facility]


   Last Admin: 06/14/19 06:04 Dose:  2 mg


Heparin Sodium (Porcine) (Heparin -)  5,000 unit SQ BID [facility]


   Last Admin: 06/14/19 09:07 Dose:  5,000 unit


Isosorbide Mononitrate (Imdur -)  60 mg PO DAILY [facility]


   Last Admin: 06/14/19 09:07 Dose:  60 mg


Levothyroxine Sodium (Synthroid -)  50 mcg PO DAILY@0700 [facility]


   Last Admin: 06/14/19 06:04 Dose:  50 mcg


Pantoprazole Sodium (Protonix -)  40 mg PO DAILY [facility]


   Last Admin: 06/14/19 09:07 Dose:  40 mg











- Objective


Vital Signs: 


 Vital Signs











Temperature  97.4 F L  06/14/19 06:54


 


Pulse Rate  90   06/14/19 06:54


 


Respiratory Rate  20   06/14/19 06:54


 


Blood Pressure  157/87   06/14/19 06:54


 


O2 Sat by Pulse Oximetry (%)  98   06/13/19 21:00











Labs: 


 CBC, BMP





 06/11/19 07:20 





 06/13/19 08:15

## 2019-06-14 NOTE — CONSULT
Admitting History and Physical





- Primary Care Physician


PCP: Ely Villatoro





- Admission


History of Present Illness: 





89 y/o M with PMHx of DM, Arthritis, Stroke, HTN, HLD, Hypothyroidism presents 

with 2 day hx of nausea and NBNB vomiting.








 Selected Entries











  06/12/19 06/13/19 06/13/19





  18:30 06:47 10:00


 


Breakfast   25%


 


Diet Tolerated   Well


 


Lunch   50%


 


Supper 75%  


 


Temperature  97.7 F 98.4 F














  06/13/19 06/13/19 06/13/19





  16:15 18:30 22:00


 


Breakfast   


 


Diet Tolerated  Well 


 


Lunch   


 


Supper  75% 


 


Temperature 97.6 F  97.8 F














  06/14/19





  06:54


 


Breakfast 


 


Diet Tolerated 


 


Lunch 


 


Supper 


 


Temperature 97.4 F L








 Laboratory Tests











  06/11/19





  07:20


 


WBC  5.0








Advanced to soft diet/thin liquids. 





Nursing reports pt has been coughing, noted with and without meals, possibly 

more so with meals.


UGI/esophagram unremarkable.





Rapid Response called before pt examined, cold and clammy. Placed on NR. 

Assessment deferred.





- Past Medical History


Cardiovascular: Yes: HTN, Hyperlipdemia


Endocrine: Yes: Diabetes Mellitus, Hypothyroidism





- Smoking History


Smoking history: Never smoked





- Alcohol/Substance Use


Hx Alcohol Use: No





History





- Admission


Reason For Visit: HYPONATREMIA





- Diagnostics


X-ray: Report Reviewed (6/10 noted. Repeat CXR pending)





- Hearing


Hearing: Impaired


Hearing Aide: No

## 2019-06-14 NOTE — PN
Progress Note, Physician


History of Present Illness: 





events noted


better now





- Current Medication List


Current Medications: 


Active Medications





Amoxicillin/Clavulanate Potassium (Augmentin - 500mg Tablet)  1 tab PO BID@0800,

1730 [facility]


   Last Admin: 06/14/19 09:00 Dose:  1 tab


Carvedilol (Coreg -)  12.5 mg PO DAILY [facility]


   Last Admin: 06/14/19 09:07 Dose:  12.5 mg


Cilostazol (Pletal -)  50 mg PO DAILY [facility]


   Last Admin: 06/14/19 09:09 Dose:  50 mg


Glimepiride (Amaryl -)  2 mg PO ACBK [facility]


   Last Admin: 06/14/19 06:04 Dose:  2 mg


Heparin Sodium (Porcine) (Heparin -)  5,000 unit SQ BID [facility]


   Last Admin: 06/14/19 09:07 Dose:  5,000 unit


Isosorbide Mononitrate (Imdur -)  60 mg PO DAILY [facility]


   Last Admin: 06/14/19 09:07 Dose:  60 mg


Levothyroxine Sodium (Synthroid -)  50 mcg PO DAILY@0700 [facility]


   Last Admin: 06/14/19 06:04 Dose:  50 mcg


Pantoprazole Sodium (Protonix -)  40 mg PO DAILY [facility]


   Last Admin: 06/14/19 09:07 Dose:  40 mg











- Objective


Vital Signs: 


 Vital Signs











Temperature  98.3 F   06/14/19 09:00


 


Pulse Rate  111 H  06/14/19 10:00


 


Respiratory Rate  20   06/14/19 10:00


 


Blood Pressure  167/103 H  06/14/19 10:00


 


O2 Sat by Pulse Oximetry (%)  98   06/13/19 21:00











Constitutional: Yes: No Distress


HENT: Yes: Atraumatic


Neck: Yes: Supple


Cardiovascular: Yes: Regular Rate and Rhythm


Respiratory: Yes: CTA Bilaterally


Gastrointestinal: Yes: Normal Bowel Sounds


Extremities: Yes: WNL


Edema: No


Peripheral Pulses WNL: Yes


Neurological: Yes: Alert, Oriented


Labs: 


 CBC, BMP





 06/14/19 11:24 





 06/14/19 11:24 











Problem List





- Problems


(1) Hyponatremia


Assessment/Plan: 


improving


Code(s): E87.1 - HYPO-OSMOLALITY AND HYPONATREMIA   





(2) Wheezing


Code(s): R06.2 - WHEEZING   





(3) Hypothyroid


Assessment/Plan: 


on meds


Code(s): E03.9 - HYPOTHYROIDISM, UNSPECIFIED   





(4) Diabetes mellitus


Assessment/Plan: 


monitor bs


Code(s): E11.9 - TYPE 2 DIABETES MELLITUS WITHOUT COMPLICATIONS   





(5) HTN (hypertension)


Assessment/Plan: 


on meds


Code(s): I10 - ESSENTIAL (PRIMARY) HYPERTENSION   





(6) Hyperkalemia


Assessment/Plan: 


wnl now


Code(s): E87.5 - HYPERKALEMIA   





(7) Vomiting


Assessment/Plan: 


prn zofran


resolved


Code(s): R11.10 - VOMITING, UNSPECIFIED   





(8) Fluid overload


Assessment/Plan: 


dc ivf


iv lasix


breathing much better


Code(s): E87.70 - FLUID OVERLOAD, UNSPECIFIED

## 2019-06-14 NOTE — RAPID
Physical Examination


Vital Signs: 


 Vital Signs











Temperature  97.4 F L  06/14/19 06:54


 


Pulse Rate  90   06/14/19 06:54


 


Respiratory Rate  20   06/14/19 06:54


 


Blood Pressure  157/87   06/14/19 06:54


 


O2 Sat by Pulse Oximetry (%)  98   06/13/19 21:00











Findings/Remarks: 








Called to evaluate patient for oxygen desaturation. Patient placed on 

nonrebreather.  Sat at 66, over time saturation increased to 98. On evaluation 


167/103,  98% hr 111


171/115 99 on norebreather


A:


crackles b/l bases, patient clammy and damp





Plan


CXR


ABG, CBC, CMP


hold fluids, lasix 40 given, imbur 30 ordered


continue oxygen supplementation to keep above 90%


will transfer patient to tele for closer monitoring


Labs: 


 CBC, BMP





 06/11/19 07:20 





 06/13/19 08:15

## 2019-06-15 RX ADMIN — GLIMEPIRIDE SCH: 2 TABLET ORAL at 06:41

## 2019-06-15 RX ADMIN — HEPARIN SODIUM SCH UNIT: 5000 INJECTION, SOLUTION INTRAVENOUS; SUBCUTANEOUS at 09:30

## 2019-06-15 RX ADMIN — AMOXICILLIN AND CLAVULANATE POTASSIUM SCH: 500; 125 TABLET, FILM COATED ORAL at 17:44

## 2019-06-15 RX ADMIN — CILOSTAZOL SCH: 50 TABLET ORAL at 09:31

## 2019-06-15 RX ADMIN — ISOSORBIDE MONONITRATE SCH: 60 TABLET, EXTENDED RELEASE ORAL at 09:30

## 2019-06-15 RX ADMIN — LEVOTHYROXINE SODIUM SCH: 50 TABLET ORAL at 06:41

## 2019-06-15 RX ADMIN — HEPARIN SODIUM SCH UNIT: 5000 INJECTION, SOLUTION INTRAVENOUS; SUBCUTANEOUS at 22:51

## 2019-06-15 RX ADMIN — AMOXICILLIN AND CLAVULANATE POTASSIUM SCH: 500; 125 TABLET, FILM COATED ORAL at 09:29

## 2019-06-15 RX ADMIN — CARVEDILOL SCH: 25 TABLET, FILM COATED ORAL at 09:30

## 2019-06-15 RX ADMIN — PANTOPRAZOLE SODIUM SCH: 40 TABLET, DELAYED RELEASE ORAL at 09:31

## 2019-06-15 RX ADMIN — CARVEDILOL SCH MG: 25 TABLET, FILM COATED ORAL at 22:51

## 2019-06-15 NOTE — PN
Progress Note, Physician


History of Present Illness: 


feeling better





- Current Medication List


Current Medications: 


Active Medications





Amoxicillin/Clavulanate Potassium (Augmentin - 500mg Tablet)  1 tab PO BID@0800,

1730 [facility]


   Last Admin: 06/15/19 09:29 Dose:  Not Given


Carvedilol (Coreg -)  25 mg PO BID [facility]


   Last Admin: 06/15/19 09:30 Dose:  Not Given


Cilostazol (Pletal -)  50 mg PO DAILY [facility]


   Last Admin: 06/15/19 09:31 Dose:  Not Given


Glimepiride (Amaryl -)  2 mg PO ACBK [facility]


   Last Admin: 06/15/19 06:41 Dose:  Not Given


Heparin Sodium (Porcine) (Heparin -)  5,000 unit SQ BID [facility]


   Last Admin: 06/15/19 09:30 Dose:  5,000 unit


Isosorbide Mononitrate (Imdur -)  60 mg PO DAILY [facility]


   Last Admin: 06/15/19 09:30 Dose:  Not Given


Levothyroxine Sodium (Synthroid -)  50 mcg PO DAILY@0700 [facility]


   Last Admin: 06/15/19 06:41 Dose:  Not Given


Pantoprazole Sodium (Protonix -)  40 mg PO DAILY [facility]


   Last Admin: 06/15/19 09:31 Dose:  Not Given











- Objective


Vital Signs: 


 Vital Signs











Temperature  98.5 F   06/15/19 10:00


 


Pulse Rate  72   06/15/19 10:00


 


Respiratory Rate  20   06/15/19 10:00


 


Blood Pressure  106/64   06/15/19 10:00


 


O2 Sat by Pulse Oximetry (%)  100   06/15/19 09:00











Constitutional: Yes: No Distress


HENT: Yes: Atraumatic


Neck: Yes: Supple


Cardiovascular: Yes: Regular Rate and Rhythm


Respiratory: Yes: Rhonchi


Gastrointestinal: Yes: Normal Bowel Sounds


Extremities: Yes: WNL


Neurological: Yes: Alert, Oriented


Labs: 


 CBC, BMP





 06/14/19 11:24 





 06/14/19 11:24 











Problem List





- Problems


(1) Hyponatremia


Assessment/Plan: 


improving


Code(s): E87.1 - HYPO-OSMOLALITY AND HYPONATREMIA   





(2) Wheezing


Code(s): R06.2 - WHEEZING   





(3) Hypothyroid


Assessment/Plan: 


on meds


Code(s): E03.9 - HYPOTHYROIDISM, UNSPECIFIED   





(4) Diabetes mellitus


Assessment/Plan: 


monitor bs


Code(s): E11.9 - TYPE 2 DIABETES MELLITUS WITHOUT COMPLICATIONS   





(5) HTN (hypertension)


Code(s): I10 - ESSENTIAL (PRIMARY) HYPERTENSION   





(6) Hyperkalemia


Assessment/Plan: 


wnl now


Code(s): E87.5 - HYPERKALEMIA   





(7) Vomiting


Assessment/Plan: 


prn zofran


resolved


Code(s): R11.10 - VOMITING, UNSPECIFIED   





(8) Fluid overload


Assessment/Plan: 


breathing much improved


on iv lasix


Code(s): E87.70 - FLUID OVERLOAD, UNSPECIFIED

## 2019-06-15 NOTE — PN
Progress Note, Physician





- Current Medication List


Current Medications: 


Active Medications





Amoxicillin/Clavulanate Potassium (Augmentin - 500mg Tablet)  1 tab PO BID@0800,

1730 [facility]


   Last Admin: 06/15/19 09:29 Dose:  Not Given


Carvedilol (Coreg -)  25 mg PO BID [facility]


   Last Admin: 06/15/19 09:30 Dose:  Not Given


Cilostazol (Pletal -)  50 mg PO DAILY [facility]


   Last Admin: 06/15/19 09:31 Dose:  Not Given


Glimepiride (Amaryl -)  2 mg PO ACBK [facility]


   Last Admin: 06/15/19 06:41 Dose:  Not Given


Heparin Sodium (Porcine) (Heparin -)  5,000 unit SQ BID [facility]


   Last Admin: 06/15/19 09:30 Dose:  5,000 unit


Isosorbide Mononitrate (Imdur -)  60 mg PO DAILY [facility]


   Last Admin: 06/15/19 09:30 Dose:  Not Given


Levothyroxine Sodium (Synthroid -)  50 mcg PO DAILY@0700 [facility]


   Last Admin: 06/15/19 06:41 Dose:  Not Given


Pantoprazole Sodium (Protonix -)  40 mg PO DAILY [facility]


   Last Admin: 06/15/19 09:31 Dose:  Not Given











- Objective


Vital Signs: 


 Vital Signs











Temperature  98.5 F   06/15/19 10:00


 


Pulse Rate  72   06/15/19 10:00


 


Respiratory Rate  20   06/15/19 10:00


 


Blood Pressure  106/64   06/15/19 10:00


 


O2 Sat by Pulse Oximetry (%)  100   06/15/19 09:00











Labs: 


 CBC, BMP





 06/14/19 11:24 





 06/14/19 11:24

## 2019-06-15 NOTE — PN
Progress Note (short form)





- Note


Progress Note: 








1. hyponatremia


2. hyperkalemia


3. nausea and vomiting


4. htn


5. dm


6. hypothyroidism


7. shalom vs ckd


8. fatty liver








Active Medications





Amoxicillin/Clavulanate Potassium (Augmentin - 500mg Tablet)  1 tab PO BID@0800,

1730 [facility]


   Last Admin: 06/15/19 09:29 Dose:  Not Given


Carvedilol (Coreg -)  25 mg PO BID [facility]


   Last Admin: 06/15/19 09:30 Dose:  Not Given


Cilostazol (Pletal -)  50 mg PO DAILY [facility]


   Last Admin: 06/15/19 09:31 Dose:  Not Given


Glimepiride (Amaryl -)  2 mg PO ACBK [facility]


   Last Admin: 06/15/19 06:41 Dose:  Not Given


Heparin Sodium (Porcine) (Heparin -)  5,000 unit SQ BID [facility]


   Last Admin: 06/15/19 09:30 Dose:  5,000 unit


Isosorbide Mononitrate (Imdur -)  60 mg PO DAILY [facility]


   Last Admin: 06/15/19 09:30 Dose:  Not Given


Levothyroxine Sodium (Synthroid -)  50 mcg PO DAILY@0700 [facility]


   Last Admin: 06/15/19 06:41 Dose:  Not Given


Pantoprazole Sodium (Protonix -)  40 mg PO DAILY [facility]


   Last Admin: 06/15/19 09:31 Dose:  Not Given





lying flate


denies sob





 Last Vital Signs











Temp Pulse Resp BP Pulse Ox


 


 98.5 F   72   20   106/64   100 


 


 06/15/19 10:00  06/15/19 10:00  06/15/19 10:00  06/15/19 10:00  06/15/19 09:00





lungs clear


heart reg


abd soft nontender














 CBC, BMP





 06/14/19 11:24 





 06/14/19 11:24 








IMP

## 2019-06-16 LAB
ALBUMIN SERPL-MCNC: 2.6 G/DL (ref 3.4–5)
ALP SERPL-CCNC: 106 U/L (ref 45–117)
ALT SERPL-CCNC: 29 U/L (ref 13–61)
ANION GAP SERPL CALC-SCNC: 8 MMOL/L (ref 8–16)
AST SERPL-CCNC: 25 U/L (ref 15–37)
BASOPHILS # BLD: 2.7 % (ref 0–2)
BILIRUB SERPL-MCNC: 0.4 MG/DL (ref 0.2–1)
BUN SERPL-MCNC: 25.6 MG/DL (ref 7–18)
CALCIUM SERPL-MCNC: 8.4 MG/DL (ref 8.5–10.1)
CHLORIDE SERPL-SCNC: 100 MMOL/L (ref 98–107)
CO2 SERPL-SCNC: 29 MMOL/L (ref 21–32)
CREAT SERPL-MCNC: 1.6 MG/DL (ref 0.55–1.3)
DEPRECATED RDW RBC AUTO: 13.2 % (ref 11.9–15.9)
EOSINOPHIL # BLD: 4.1 % (ref 0–4.5)
GLUCOSE SERPL-MCNC: 93 MG/DL (ref 74–106)
HCT VFR BLD CALC: 32.1 % (ref 35.4–49)
HGB BLD-MCNC: 10.6 GM/DL (ref 11.7–16.9)
LYMPHOCYTES # BLD: 30.3 % (ref 8–40)
MCH RBC QN AUTO: 31.3 PG (ref 25.7–33.7)
MCHC RBC AUTO-ENTMCNC: 33 G/DL (ref 32–35.9)
MCV RBC: 94.7 FL (ref 80–96)
MONOCYTES # BLD AUTO: 11.9 % (ref 3.8–10.2)
NEUTROPHILS # BLD: 51 % (ref 42.8–82.8)
PLATELET # BLD AUTO: 221 K/MM3 (ref 134–434)
PMV BLD: 8 FL (ref 7.5–11.1)
POTASSIUM SERPLBLD-SCNC: 3.1 MMOL/L (ref 3.5–5.1)
PROT SERPL-MCNC: 5.5 G/DL (ref 6.4–8.2)
RBC # BLD AUTO: 3.39 M/MM3 (ref 4–5.6)
SODIUM SERPL-SCNC: 137 MMOL/L (ref 136–145)
WBC # BLD AUTO: 4 K/MM3 (ref 4–10)

## 2019-06-16 RX ADMIN — AMOXICILLIN AND CLAVULANATE POTASSIUM SCH TAB: 500; 125 TABLET, FILM COATED ORAL at 17:08

## 2019-06-16 RX ADMIN — HEPARIN SODIUM SCH UNIT: 5000 INJECTION, SOLUTION INTRAVENOUS; SUBCUTANEOUS at 09:25

## 2019-06-16 RX ADMIN — ISOSORBIDE MONONITRATE SCH MG: 60 TABLET, EXTENDED RELEASE ORAL at 09:25

## 2019-06-16 RX ADMIN — GLIMEPIRIDE SCH MG: 2 TABLET ORAL at 06:07

## 2019-06-16 RX ADMIN — PANTOPRAZOLE SODIUM SCH MG: 40 TABLET, DELAYED RELEASE ORAL at 09:26

## 2019-06-16 RX ADMIN — LEVOTHYROXINE SODIUM SCH MCG: 50 TABLET ORAL at 06:07

## 2019-06-16 RX ADMIN — CARVEDILOL SCH MG: 25 TABLET, FILM COATED ORAL at 22:03

## 2019-06-16 RX ADMIN — CILOSTAZOL SCH MG: 50 TABLET ORAL at 09:25

## 2019-06-16 RX ADMIN — HEPARIN SODIUM SCH UNIT: 5000 INJECTION, SOLUTION INTRAVENOUS; SUBCUTANEOUS at 22:03

## 2019-06-16 RX ADMIN — AMOXICILLIN AND CLAVULANATE POTASSIUM SCH TAB: 500; 125 TABLET, FILM COATED ORAL at 09:24

## 2019-06-16 RX ADMIN — CARVEDILOL SCH MG: 25 TABLET, FILM COATED ORAL at 09:25

## 2019-06-16 NOTE — PN
Progress Note (short form)





- Note


Progress Note: 








1. hyponatremia


2. hyperkalemia


3. nausea and vomiting


4. htn


5. dm


6. hypothyroidism


7. shalom vs ckd


8. fatty liver

















 Current Medications





Amoxicillin/Clavulanate Potassium (Augmentin - 500mg Tablet)  1 tab PO BID@0800,

1730 [facility]


   Last Admin: 06/16/19 17:08 Dose:  1 tab


Carvedilol (Coreg -)  25 mg PO BID [facility]


   Last Admin: 06/16/19 09:25 Dose:  25 mg


Cilostazol (Pletal -)  50 mg PO DAILY [facility]


   Last Admin: 06/16/19 09:25 Dose:  50 mg


Glimepiride (Amaryl -)  2 mg PO ACBK [facility]


   Last Admin: 06/16/19 06:07 Dose:  2 mg


Heparin Sodium (Porcine) (Heparin -)  5,000 unit SQ BID [facility]


   Last Admin: 06/16/19 09:25 Dose:  5,000 unit


Isosorbide Mononitrate (Imdur -)  60 mg PO DAILY [facility]


   Last Admin: 06/16/19 09:25 Dose:  60 mg


Levothyroxine Sodium (Synthroid -)  50 mcg PO DAILY@0700 [facility]


   Last Admin: 06/16/19 06:07 Dose:  50 mcg


Pantoprazole Sodium (Protonix -)  40 mg PO DAILY [facility]


   Last Admin: 06/16/19 09:26 Dose:  40 mg


Potassium Chloride (Potassium Chloride Oral Liquid)  40 meq PO ONCE ONE


   Stop: 06/17/19 10:01

















lying flate


denies sob





  Last Vital Signs











Temp Pulse Resp BP Pulse Ox


 


 97.9 F   58 L  20   107/54 L  100 


 


 06/16/19 17:00  06/16/19 17:00  06/16/19 17:00  06/16/19 17:00  06/16/19 09:00

















lungs clear


heart reg


abd soft nontender








 CBC, BMP





 06/16/19 06:32 





 06/16/19 06:00 











 CBC, BMP





 06/14/19 11:24 





 06/14/19 11:24 








IMP 


hyponatremi resolving


new hypokalemia- k replaced 














plan  - continue present rx

## 2019-06-16 NOTE — PN
Progress Note, Physician


History of Present Illness: 


feeling better





- Current Medication List


Current Medications: 


Active Medications





Amoxicillin/Clavulanate Potassium (Augmentin - 500mg Tablet)  1 tab PO BID@0800,

1730 [facility]


   Last Admin: 06/16/19 09:24 Dose:  1 tab


Carvedilol (Coreg -)  25 mg PO BID [facility]


   Last Admin: 06/16/19 09:25 Dose:  25 mg


Cilostazol (Pletal -)  50 mg PO DAILY [facility]


   Last Admin: 06/16/19 09:25 Dose:  50 mg


Glimepiride (Amaryl -)  2 mg PO ACBK [facility]


   Last Admin: 06/16/19 06:07 Dose:  2 mg


Heparin Sodium (Porcine) (Heparin -)  5,000 unit SQ BID [facility]


   Last Admin: 06/16/19 09:25 Dose:  5,000 unit


Isosorbide Mononitrate (Imdur -)  60 mg PO DAILY [facility]


   Last Admin: 06/16/19 09:25 Dose:  60 mg


Levothyroxine Sodium (Synthroid -)  50 mcg PO DAILY@0700 [facility]


   Last Admin: 06/16/19 06:07 Dose:  50 mcg


Pantoprazole Sodium (Protonix -)  40 mg PO DAILY [facility]


   Last Admin: 06/16/19 09:26 Dose:  40 mg











- Objective


Vital Signs: 


 Vital Signs











Temperature  97.7 F   06/16/19 09:23


 


Pulse Rate  63   06/16/19 09:23


 


Respiratory Rate  18   06/16/19 09:23


 


Blood Pressure  124/60   06/16/19 09:23


 


O2 Sat by Pulse Oximetry (%)  100   06/16/19 09:00











Constitutional: Yes: No Distress


HENT: Yes: Atraumatic


Neck: Yes: Supple


Cardiovascular: Yes: Regular Rate and Rhythm


Respiratory: Yes: CTA Bilaterally


Gastrointestinal: Yes: Normal Bowel Sounds


Extremities: Yes: WNL


Edema: No


Peripheral Pulses WNL: Yes


Neurological: Yes: Alert, Oriented


Labs: 


 CBC, BMP





 06/16/19 06:32 





 06/16/19 06:00 











Problem List





- Problems


(1) Hyponatremia


Assessment/Plan: 


resolved


Code(s): E87.1 - HYPO-OSMOLALITY AND HYPONATREMIA   





(2) Wheezing


Code(s): R06.2 - WHEEZING   





(3) Hypothyroid


Assessment/Plan: 


on meds


Code(s): E03.9 - HYPOTHYROIDISM, UNSPECIFIED   





(4) Diabetes mellitus


Assessment/Plan: 


monitor bs


Code(s): E11.9 - TYPE 2 DIABETES MELLITUS WITHOUT COMPLICATIONS   





(5) HTN (hypertension)


Assessment/Plan: 


on meds


Code(s): I10 - ESSENTIAL (PRIMARY) HYPERTENSION   





(6) Hyperkalemia


Assessment/Plan: 


wnl now


Code(s): E87.5 - HYPERKALEMIA   





(7) Vomiting


Assessment/Plan: 


prn zofran


resolved


Code(s): R11.10 - VOMITING, UNSPECIFIED   





(8) Fluid overload


Assessment/Plan: 


breathing much improved


Code(s): E87.70 - FLUID OVERLOAD, UNSPECIFIED

## 2019-06-16 NOTE — PN
Progress Note, Physician


History of Present Illness: 





patient stable


no new issues





- Current Medication List


Current Medications: 


Active Medications





Amoxicillin/Clavulanate Potassium (Augmentin - 500mg Tablet)  1 tab PO BID@0800,

1730 [facility]


   Last Admin: 06/16/19 09:24 Dose:  1 tab


Carvedilol (Coreg -)  25 mg PO BID [facility]


   Last Admin: 06/16/19 09:25 Dose:  25 mg


Cilostazol (Pletal -)  50 mg PO DAILY [facility]


   Last Admin: 06/16/19 09:25 Dose:  50 mg


Glimepiride (Amaryl -)  2 mg PO ACBK [facility]


   Last Admin: 06/16/19 06:07 Dose:  2 mg


Heparin Sodium (Porcine) (Heparin -)  5,000 unit SQ BID [facility]


   Last Admin: 06/16/19 09:25 Dose:  5,000 unit


Isosorbide Mononitrate (Imdur -)  60 mg PO DAILY [facility]


   Last Admin: 06/16/19 09:25 Dose:  60 mg


Levothyroxine Sodium (Synthroid -)  50 mcg PO DAILY@0700 [facility]


   Last Admin: 06/16/19 06:07 Dose:  50 mcg


Pantoprazole Sodium (Protonix -)  40 mg PO DAILY [facility]


   Last Admin: 06/16/19 09:26 Dose:  40 mg











- Objective


Vital Signs: 


 Vital Signs











Temperature  97.7 F   06/16/19 09:23


 


Pulse Rate  63   06/16/19 09:23


 


Respiratory Rate  18   06/16/19 09:23


 


Blood Pressure  124/60   06/16/19 09:23


 


O2 Sat by Pulse Oximetry (%)  100   06/16/19 09:00











Constitutional: Yes: No Distress, Calm


Cardiovascular: Yes: Regular Rate and Rhythm


Respiratory: Yes: Regular, CTA Bilaterally


Gastrointestinal: Yes: Normal Bowel Sounds, Soft


Musculoskeletal: Yes: WNL


Extremities: Yes: WNL


Labs: 


 CBC, BMP





 06/16/19 06:32 





 06/16/19 06:00 











Assessment/Plan





 Problem List 





- Problems


(1) Hyponatremia


Code(s): E87.1 - HYPO-OSMOLALITY AND HYPONATREMIA   





(2) Wheezing


Code(s): R06.2 - WHEEZING   





3 vomiting


cough





plan





continue current mgmt


await for all reports


rest as per the team

## 2019-06-17 VITALS — HEART RATE: 60 BPM | SYSTOLIC BLOOD PRESSURE: 127 MMHG | TEMPERATURE: 98 F | DIASTOLIC BLOOD PRESSURE: 52 MMHG

## 2019-06-17 LAB
ANION GAP SERPL CALC-SCNC: 7 MMOL/L (ref 8–16)
BUN SERPL-MCNC: 32 MG/DL (ref 7–18)
CALCIUM SERPL-MCNC: 8.4 MG/DL (ref 8.5–10.1)
CHLORIDE SERPL-SCNC: 103 MMOL/L (ref 98–107)
CO2 SERPL-SCNC: 29 MMOL/L (ref 21–32)
CREAT SERPL-MCNC: 1.7 MG/DL (ref 0.55–1.3)
GLUCOSE SERPL-MCNC: 108 MG/DL (ref 74–106)
POTASSIUM SERPLBLD-SCNC: 3.8 MMOL/L (ref 3.5–5.1)
SODIUM SERPL-SCNC: 140 MMOL/L (ref 136–145)

## 2019-06-17 RX ADMIN — PANTOPRAZOLE SODIUM SCH MG: 40 TABLET, DELAYED RELEASE ORAL at 09:42

## 2019-06-17 RX ADMIN — ISOSORBIDE MONONITRATE SCH MG: 60 TABLET, EXTENDED RELEASE ORAL at 09:42

## 2019-06-17 RX ADMIN — HEPARIN SODIUM SCH UNIT: 5000 INJECTION, SOLUTION INTRAVENOUS; SUBCUTANEOUS at 09:42

## 2019-06-17 RX ADMIN — GLIMEPIRIDE SCH MG: 2 TABLET ORAL at 06:32

## 2019-06-17 RX ADMIN — CARVEDILOL SCH MG: 25 TABLET, FILM COATED ORAL at 09:42

## 2019-06-17 RX ADMIN — CILOSTAZOL SCH MG: 50 TABLET ORAL at 09:43

## 2019-06-17 RX ADMIN — AMOXICILLIN AND CLAVULANATE POTASSIUM SCH TAB: 500; 125 TABLET, FILM COATED ORAL at 08:14

## 2019-06-17 RX ADMIN — LEVOTHYROXINE SODIUM SCH MCG: 50 TABLET ORAL at 06:32

## 2019-06-17 NOTE — PN
Progress Note, Physician


History of Present Illness: 





Pt seen and examined at bedside. He is awake and appears comfortable. He denies 

shortness of breath. 





- Current Medication List


Current Medications: 


Active Medications





Amoxicillin/Clavulanate Potassium (Augmentin - 500mg Tablet)  1 tab PO BID@0800,

1730 [facility]


   Last Admin: 06/17/19 08:14 Dose:  1 tab


Carvedilol (Coreg -)  25 mg PO BID [facility]


   Last Admin: 06/17/19 09:42 Dose:  25 mg


Cilostazol (Pletal -)  50 mg PO DAILY [facility]


   Last Admin: 06/17/19 09:43 Dose:  50 mg


Glimepiride (Amaryl -)  2 mg PO ACBK [facility]


   Last Admin: 06/17/19 06:32 Dose:  2 mg


Heparin Sodium (Porcine) (Heparin -)  5,000 unit SQ BID [facility]


   Last Admin: 06/17/19 09:42 Dose:  5,000 unit


Isosorbide Mononitrate (Imdur -)  60 mg PO DAILY [facility]


   Last Admin: 06/17/19 09:42 Dose:  60 mg


Levothyroxine Sodium (Synthroid -)  50 mcg PO DAILY@0700 [facility]


   Last Admin: 06/17/19 06:32 Dose:  50 mcg


Pantoprazole Sodium (Protonix -)  40 mg PO DAILY [facility]


   Last Admin: 06/17/19 09:42 Dose:  40 mg


Potassium Chloride (Potassium Chloride Oral Liquid)  40 meq PO ONCE ONE


   Stop: 06/17/19 10:01


   Last Admin: 06/17/19 09:45 Dose:  40 meq











- Objective


Vital Signs: 


 Vital Signs











Temperature  97.6 F   06/17/19 06:00


 


Pulse Rate  62   06/17/19 06:00


 


Respiratory Rate  20   06/17/19 06:00


 


Blood Pressure  130/61   06/17/19 06:00


 


O2 Sat by Pulse Oximetry (%)  98   06/16/19 22:00











Constitutional: Yes: Calm


Eyes: Yes: Conjunctiva Clear


HENT: Yes: Atraumatic


Neck: Yes: Supple


Cardiovascular: Yes: S1, S2


Respiratory: Yes: CTA Bilaterally, On Nasal O2


Gastrointestinal: Yes: Soft


Genitourinary: Yes: WNL


Musculoskeletal: Yes: WNL


Edema: No


Neurological: Yes: Oriented


Psychiatric: Yes: Oriented


Labs: 


 CBC, BMP





 06/16/19 06:32 





 06/17/19 06:20 











Problem List





- Problems


(1) Hyperkalemia


Code(s): E87.5 - HYPERKALEMIA   





(2) HTN (hypertension)


Code(s): I10 - ESSENTIAL (PRIMARY) HYPERTENSION   





(3) Diabetes mellitus


Code(s): E11.9 - TYPE 2 DIABETES MELLITUS WITHOUT COMPLICATIONS   





(4) Vomiting


Code(s): R11.10 - VOMITING, UNSPECIFIED   





(5) Hyponatremia


Code(s): E87.1 - HYPO-OSMOLALITY AND HYPONATREMIA   





Assessment/Plan


 Current Medications











Generic Name Dose Route Start Last Admin





  Trade Name Freq  PRN Reason Stop Dose Admin


 


Amoxicillin/Clavulanate Potassium  1 tab  06/14/19 17:30  06/17/19 08:14





  Augmentin - 500mg Tablet  PO   1 tab





  BID@0800,1730 [facility]   Administration





     





     





     





     


 


Carvedilol  25 mg  06/14/19 22:00  06/17/19 09:42





  Coreg -  PO   25 mg





  BID [facility]   Administration





     





     





     





     


 


Cilostazol  50 mg  06/15/19 10:00  06/17/19 09:43





  Pletal -  PO   50 mg





  DAILY [facility]   Administration





     





     





     





     


 


Glimepiride  2 mg  06/15/19 07:00  06/17/19 06:32





  Amaryl -  PO   2 mg





  ACBK [facility]   Administration





     





     





     





     


 


Heparin Sodium (Porcine)  5,000 unit  06/14/19 22:00  06/17/19 09:42





  Heparin -  SQ   5,000 unit





  BID [facility]   Administration





     





     





     





     


 


Isosorbide Mononitrate  60 mg  06/15/19 10:00  06/17/19 09:42





  Imdur -  PO   60 mg





  DAILY [facility]   Administration





     





     





     





     


 


Levothyroxine Sodium  50 mcg  06/15/19 07:00  06/17/19 06:32





  Synthroid -  PO   50 mcg





  DAILY@0700 [facility]   Administration





     





     





     





     


 


Pantoprazole Sodium  40 mg  06/15/19 10:00  06/17/19 09:42





  Protonix -  PO   40 mg





  DAILY [facility]   Administration





     





     





     





     


 


Potassium Chloride  40 meq  06/17/19 10:00  06/17/19 09:45





  Potassium Chloride Oral Liquid  PO  06/17/19 10:01  40 meq





  ONCE ONE   Administration





     





     





     





     














Impression


1. hyponatremia


2. hyperkalemia


3. nausea and vomiting


4. htn


5. dm


6. hypothyroidism


7. shalom vs ckd


8. fatty liver





Plan


- volume status improved


- cont bp meds


- bp is improved


- monitor lytes 


- check mag

## 2019-06-17 NOTE — DS
Physical Examination


Vital Signs: 


 Vital Signs











Temperature  98 F   06/17/19 10:00


 


Pulse Rate  60   06/17/19 10:00


 


Respiratory Rate  18   06/17/19 10:00


 


Blood Pressure  127/52 L  06/17/19 10:00


 


O2 Sat by Pulse Oximetry (%)  98   06/17/19 09:00











Constitutional: Yes: No Distress


HENT: Yes: Atraumatic


Neck: Yes: Supple


Cardiovascular: Yes: Regular Rate and Rhythm


Respiratory: Yes: CTA Bilaterally


Gastrointestinal: Yes: Normal Bowel Sounds


Extremities: Yes: WNL


Edema: No


Neurological: Yes: Alert, Oriented


Labs: 


 CBC, BMP





 06/16/19 06:32 





 06/17/19 06:20 











Discharge Summary


Reason For Visit: HYPONATREMIA


Current Active Problems





Diabetes mellitus (Acute)


Dysphagia (Acute)


Fluid overload (Acute)


HTN (hypertension) (Acute)


Hyperkalemia (Acute)


Hyponatremia (Acute)


Hypothyroid (Acute)


Nausea & vomiting (Acute)


Vomiting (Acute)


Wheezing (Acute)








Condition: Guarded





- Instructions


Referrals: 


Ely Villatoro MD [Staff Physician] - 


Jose Khan MD [Staff Physician] - 


Disposition: HOME





- Home Medications


Comprehensive Discharge Medication List: 


Ambulatory Orders





Cilostazol 50 mg PO DAILY 06/10/19 


Glimepiride 2 mg PO [facility] 06/10/19 


Isosorbide Mononitrate [Isosorbide Mononitrate ER] 60 mg PO DAILY 06/10/19 


Levothyroxine [Synthroid -] 50 mcg PO ACBK 06/10/19 


Carvedilol [Coreg -] 25 mg PO BID #14 tablet 06/17/19

## 2019-06-17 NOTE — PN
Progress Note, Physician


History of Present Illness: 





stable


no new issues





- Current Medication List


Current Medications: 


Active Medications





Amoxicillin/Clavulanate Potassium (Augmentin - 500mg Tablet)  1 tab PO BID@0800,

1730 [facility]


   Last Admin: 06/17/19 08:14 Dose:  1 tab


Carvedilol (Coreg -)  25 mg PO BID [facility]


   Last Admin: 06/17/19 09:42 Dose:  25 mg


Cilostazol (Pletal -)  50 mg PO DAILY [facility]


   Last Admin: 06/17/19 09:43 Dose:  50 mg


Glimepiride (Amaryl -)  2 mg PO ACBK [facility]


   Last Admin: 06/17/19 06:32 Dose:  2 mg


Heparin Sodium (Porcine) (Heparin -)  5,000 unit SQ BID [facility]


   Last Admin: 06/17/19 09:42 Dose:  5,000 unit


Isosorbide Mononitrate (Imdur -)  60 mg PO DAILY [facility]


   Last Admin: 06/17/19 09:42 Dose:  60 mg


Levothyroxine Sodium (Synthroid -)  50 mcg PO DAILY@0700 [facility]


   Last Admin: 06/17/19 06:32 Dose:  50 mcg


Pantoprazole Sodium (Protonix -)  40 mg PO DAILY [facility]


   Last Admin: 06/17/19 09:42 Dose:  40 mg











- Objective


Vital Signs: 


 Vital Signs











Temperature  98 F   06/17/19 10:00


 


Pulse Rate  60   06/17/19 10:00


 


Respiratory Rate  18   06/17/19 10:00


 


Blood Pressure  127/52 L  06/17/19 10:00


 


O2 Sat by Pulse Oximetry (%)  98   06/16/19 22:00











Constitutional: Yes: No Distress, Calm


Cardiovascular: Yes: Regular Rate and Rhythm


Respiratory: Yes: Regular, CTA Bilaterally


Gastrointestinal: Yes: Normal Bowel Sounds, Soft


Musculoskeletal: Yes: WNL


Extremities: Yes: WNL


Neurological: Yes: Alert, Oriented


Psychiatric: Yes: Alert, Oriented


Labs: 


 CBC, BMP





 06/16/19 06:32 





 06/17/19 06:20 











Assessment/Plan





 Problem List 





- Problems


(1) Hyponatremia


Code(s): E87.1 - HYPO-OSMOLALITY AND HYPONATREMIA   





(2) Wheezing


Code(s): R06.2 - WHEEZING   





3 vomiting


cough





plan





continue current mgmt


will stop abx


monitor


rest as per team

## 2020-12-30 NOTE — PN
Progress Note, Physician


History of Present Illness: 





stable


no new issues


gi note noted





- Current Medication List


Current Medications: 


Active Medications





Carvedilol (Coreg -)  12.5 mg PO DAILY [facility]


   Last Admin: 06/12/19 10:19 Dose:  12.5 mg


Cilostazol (Pletal -)  50 mg PO DAILY [facility]


   Last Admin: 06/12/19 10:22 Dose:  50 mg


Glimepiride (Amaryl -)  2 mg PO ACBK [facility]


   Last Admin: 06/12/19 10:19 Dose:  2 mg


Heparin Sodium (Porcine) (Heparin -)  5,000 unit SQ BID [facility]


   Last Admin: 06/12/19 10:19 Dose:  5,000 unit


Sodium Chloride (Normal Saline -)  1,000 mls @ 100 mls/hr IV ASDIR [facility]


   Last Admin: 06/11/19 21:25 Dose:  Not Given


Ceftriaxone Sodium 1 gm/ (Dextrose)  50 mls @ 100 mls/hr IVPB DAILY [facility]; 

Protocol


   Last Admin: 06/11/19 09:52 Dose:  100 mls/hr


Azithromycin (Zithromax 500mg Ivpb (Pre-Docked))  500 mg in 250 mls @ 250 mls/

hr IVPB DAILY [facility]


   Last Admin: 06/12/19 10:20 Dose:  250 mls/hr


Isosorbide Mononitrate (Imdur -)  60 mg PO DAILY [facility]


   Last Admin: 06/12/19 10:19 Dose:  60 mg


Levothyroxine Sodium (Synthroid -)  50 mcg PO DAILY@0700 [facility]


   Last Admin: 06/12/19 10:19 Dose:  50 mcg


Pantoprazole Sodium (Protonix -)  40 mg PO DAILY [facility]


   Last Admin: 06/12/19 10:19 Dose:  40 mg











- Objective


Vital Signs: 


 Vital Signs











Temperature  98.8 F   06/12/19 10:00


 


Pulse Rate  80   06/12/19 10:00


 


Respiratory Rate  20   06/12/19 10:00


 


Blood Pressure  148/84   06/12/19 10:00


 


O2 Sat by Pulse Oximetry (%)  98   06/11/19 21:00











Constitutional: Yes: Calm


Cardiovascular: Yes: Regular Rate and Rhythm


Respiratory: Yes: Regular, CTA Bilaterally


Gastrointestinal: Yes: Normal Bowel Sounds, Soft


Musculoskeletal: Yes: WNL


Extremities: Yes: WNL


Neurological: Yes: Alert, Oriented


Psychiatric: Yes: Alert, Oriented


Labs: 


 CBC, BMP





 06/11/19 07:20 





 06/12/19 06:21 











Assessment/Plan





 Problem List 





- Problems


(1) Hyponatremia


Code(s): E87.1 - HYPO-OSMOLALITY AND HYPONATREMIA   





(2) Wheezing


Code(s): R06.2 - WHEEZING   





3 vomiting


cough





plan





continue current mgmt


await for all reports


rest as per the team [facility]